# Patient Record
Sex: FEMALE | Race: OTHER | HISPANIC OR LATINO | URBAN - METROPOLITAN AREA
[De-identification: names, ages, dates, MRNs, and addresses within clinical notes are randomized per-mention and may not be internally consistent; named-entity substitution may affect disease eponyms.]

---

## 2017-06-29 ENCOUNTER — OUTPATIENT (OUTPATIENT)
Dept: OUTPATIENT SERVICES | Facility: HOSPITAL | Age: 22
LOS: 1 days | End: 2017-06-29
Payer: MEDICAID

## 2017-06-29 DIAGNOSIS — O03.9 COMPLETE OR UNSPECIFIED SPONTANEOUS ABORTION WITHOUT COMPLICATION: Chronic | ICD-10-CM

## 2017-06-29 DIAGNOSIS — O47.02 FALSE LABOR BEFORE 37 COMPLETED WEEKS OF GESTATION, SECOND TRIMESTER: ICD-10-CM

## 2017-06-29 LAB
APPEARANCE UR: CLEAR — SIGNIFICANT CHANGE UP
BACTERIA # UR AUTO: ABNORMAL
BILIRUB UR-MCNC: NEGATIVE — SIGNIFICANT CHANGE UP
BLD GP AB SCN SERPL QL: SIGNIFICANT CHANGE UP
COLOR SPEC: YELLOW — SIGNIFICANT CHANGE UP
DIFF PNL FLD: ABNORMAL
EPI CELLS # UR: SIGNIFICANT CHANGE UP
GLUCOSE UR QL: NEGATIVE MG/DL — SIGNIFICANT CHANGE UP
HCT VFR BLD CALC: 33.9 % — LOW (ref 37–47)
HGB BLD-MCNC: 11.6 G/DL — LOW (ref 12–16)
KETONES UR-MCNC: NEGATIVE — SIGNIFICANT CHANGE UP
LEUKOCYTE ESTERASE UR-ACNC: ABNORMAL
MCHC RBC-ENTMCNC: 32.1 PG — HIGH (ref 27–31)
MCHC RBC-ENTMCNC: 34.2 G/DL — SIGNIFICANT CHANGE UP (ref 32–36)
MCV RBC AUTO: 93.9 FL — SIGNIFICANT CHANGE UP (ref 81–99)
NITRITE UR-MCNC: NEGATIVE — SIGNIFICANT CHANGE UP
PH UR: 8 — SIGNIFICANT CHANGE UP (ref 5–8)
PLATELET # BLD AUTO: 156 K/UL — SIGNIFICANT CHANGE UP (ref 150–400)
PROT UR-MCNC: 15 MG/DL
RBC # BLD: 3.61 M/UL — LOW (ref 4.4–5.2)
RBC # FLD: 13.4 % — SIGNIFICANT CHANGE UP (ref 11–15.6)
RBC CASTS # UR COMP ASSIST: ABNORMAL /HPF (ref 0–4)
SP GR SPEC: 1.01 — SIGNIFICANT CHANGE UP (ref 1.01–1.02)
TYPE + AB SCN PNL BLD: SIGNIFICANT CHANGE UP
UROBILINOGEN FLD QL: NEGATIVE MG/DL — SIGNIFICANT CHANGE UP
WBC # BLD: 11.1 K/UL — HIGH (ref 4.8–10.8)
WBC # FLD AUTO: 11.1 K/UL — HIGH (ref 4.8–10.8)
WBC UR QL: ABNORMAL

## 2017-06-29 PROCEDURE — 76815 OB US LIMITED FETUS(S): CPT | Mod: 26

## 2017-06-29 PROCEDURE — 76815 OB US LIMITED FETUS(S): CPT

## 2017-06-29 PROCEDURE — 59025 FETAL NON-STRESS TEST: CPT

## 2017-06-29 PROCEDURE — 86900 BLOOD TYPING SEROLOGIC ABO: CPT

## 2017-06-29 PROCEDURE — 86850 RBC ANTIBODY SCREEN: CPT

## 2017-06-29 PROCEDURE — 36415 COLL VENOUS BLD VENIPUNCTURE: CPT

## 2017-06-29 PROCEDURE — T1013: CPT

## 2017-06-29 PROCEDURE — 86901 BLOOD TYPING SEROLOGIC RH(D): CPT

## 2017-06-29 PROCEDURE — 76817 TRANSVAGINAL US OBSTETRIC: CPT

## 2017-06-29 PROCEDURE — 85027 COMPLETE CBC AUTOMATED: CPT

## 2017-06-29 PROCEDURE — G0463: CPT

## 2017-06-29 PROCEDURE — 81001 URINALYSIS AUTO W/SCOPE: CPT

## 2017-06-29 RX ORDER — SODIUM CHLORIDE 9 MG/ML
1000 INJECTION, SOLUTION INTRAVENOUS
Qty: 0 | Refills: 0 | Status: DISCONTINUED | OUTPATIENT
Start: 2017-06-29 | End: 2017-07-14

## 2017-06-29 RX ADMIN — SODIUM CHLORIDE 125 MILLILITER(S): 9 INJECTION, SOLUTION INTRAVENOUS at 19:19

## 2017-07-10 ENCOUNTER — OUTPATIENT (OUTPATIENT)
Dept: OUTPATIENT SERVICES | Facility: HOSPITAL | Age: 22
LOS: 1 days | End: 2017-07-10
Payer: MEDICAID

## 2017-07-10 DIAGNOSIS — O03.9 COMPLETE OR UNSPECIFIED SPONTANEOUS ABORTION WITHOUT COMPLICATION: Chronic | ICD-10-CM

## 2017-07-10 DIAGNOSIS — O47.03 FALSE LABOR BEFORE 37 COMPLETED WEEKS OF GESTATION, THIRD TRIMESTER: ICD-10-CM

## 2017-07-10 LAB
APTT BLD: 29.8 SEC — SIGNIFICANT CHANGE UP (ref 27.5–37.4)
HCT VFR BLD CALC: 35.4 % — LOW (ref 37–47)
HGB BLD-MCNC: 12 G/DL — SIGNIFICANT CHANGE UP (ref 12–16)
INR BLD: 0.97 RATIO — SIGNIFICANT CHANGE UP (ref 0.88–1.16)
MCHC RBC-ENTMCNC: 32.3 PG — HIGH (ref 27–31)
MCHC RBC-ENTMCNC: 33.9 G/DL — SIGNIFICANT CHANGE UP (ref 32–36)
MCV RBC AUTO: 95.2 FL — SIGNIFICANT CHANGE UP (ref 81–99)
PLATELET # BLD AUTO: 151 K/UL — SIGNIFICANT CHANGE UP (ref 150–400)
PROTHROM AB SERPL-ACNC: 10.7 SEC — SIGNIFICANT CHANGE UP (ref 9.8–12.7)
RBC # BLD: 3.72 M/UL — LOW (ref 4.4–5.2)
RBC # FLD: 13 % — SIGNIFICANT CHANGE UP (ref 11–15.6)
WBC # BLD: 12.6 K/UL — HIGH (ref 4.8–10.8)
WBC # FLD AUTO: 12.6 K/UL — HIGH (ref 4.8–10.8)

## 2017-07-10 PROCEDURE — G0463: CPT

## 2017-07-10 PROCEDURE — 85610 PROTHROMBIN TIME: CPT

## 2017-07-10 PROCEDURE — 76817 TRANSVAGINAL US OBSTETRIC: CPT

## 2017-07-10 PROCEDURE — 85730 THROMBOPLASTIN TIME PARTIAL: CPT

## 2017-07-10 PROCEDURE — 59025 FETAL NON-STRESS TEST: CPT

## 2017-07-10 PROCEDURE — 36415 COLL VENOUS BLD VENIPUNCTURE: CPT

## 2017-07-10 PROCEDURE — 76811 OB US DETAILED SNGL FETUS: CPT

## 2017-07-10 PROCEDURE — 85027 COMPLETE CBC AUTOMATED: CPT

## 2017-07-16 ENCOUNTER — OUTPATIENT (OUTPATIENT)
Dept: OUTPATIENT SERVICES | Facility: HOSPITAL | Age: 22
LOS: 1 days | End: 2017-07-16
Payer: MEDICAID

## 2017-07-16 DIAGNOSIS — O47.02 FALSE LABOR BEFORE 37 COMPLETED WEEKS OF GESTATION, SECOND TRIMESTER: ICD-10-CM

## 2017-07-16 DIAGNOSIS — O03.9 COMPLETE OR UNSPECIFIED SPONTANEOUS ABORTION WITHOUT COMPLICATION: Chronic | ICD-10-CM

## 2017-07-16 PROCEDURE — 59025 FETAL NON-STRESS TEST: CPT

## 2017-07-16 PROCEDURE — G0463: CPT

## 2017-07-23 ENCOUNTER — INPATIENT (INPATIENT)
Facility: HOSPITAL | Age: 22
LOS: 3 days | Discharge: HOME | DRG: 782 | End: 2017-07-27
Attending: OBSTETRICS & GYNECOLOGY | Admitting: OBSTETRICS & GYNECOLOGY
Payer: MEDICAID

## 2017-07-23 VITALS — HEIGHT: 60 IN | WEIGHT: 97 LBS

## 2017-07-23 DIAGNOSIS — O47.02 FALSE LABOR BEFORE 37 COMPLETED WEEKS OF GESTATION, SECOND TRIMESTER: ICD-10-CM

## 2017-07-23 DIAGNOSIS — O03.9 COMPLETE OR UNSPECIFIED SPONTANEOUS ABORTION WITHOUT COMPLICATION: Chronic | ICD-10-CM

## 2017-07-23 DIAGNOSIS — O26.893 OTHER SPECIFIED PREGNANCY RELATED CONDITIONS, THIRD TRIMESTER: ICD-10-CM

## 2017-07-23 LAB
BLD GP AB SCN SERPL QL: SIGNIFICANT CHANGE UP
HCT VFR BLD CALC: 35 % — LOW (ref 37–47)
HGB BLD-MCNC: 11.5 G/DL — LOW (ref 12–16)
MCHC RBC-ENTMCNC: 31 PG — SIGNIFICANT CHANGE UP (ref 27–31)
MCHC RBC-ENTMCNC: 32.9 G/DL — SIGNIFICANT CHANGE UP (ref 32–36)
MCV RBC AUTO: 94.3 FL — SIGNIFICANT CHANGE UP (ref 81–99)
PLATELET # BLD AUTO: 175 K/UL — SIGNIFICANT CHANGE UP (ref 150–400)
RBC # BLD: 3.71 M/UL — LOW (ref 4.4–5.2)
RBC # FLD: 12.8 % — SIGNIFICANT CHANGE UP (ref 11–15.6)
TYPE + AB SCN PNL BLD: SIGNIFICANT CHANGE UP
WBC # BLD: 10.3 K/UL — SIGNIFICANT CHANGE UP (ref 4.8–10.8)
WBC # FLD AUTO: 10.3 K/UL — SIGNIFICANT CHANGE UP (ref 4.8–10.8)

## 2017-07-23 PROCEDURE — 76815 OB US LIMITED FETUS(S): CPT | Mod: 26,77

## 2017-07-23 RX ORDER — SODIUM CHLORIDE 9 MG/ML
3 INJECTION INTRAMUSCULAR; INTRAVENOUS; SUBCUTANEOUS EVERY 8 HOURS
Qty: 0 | Refills: 0 | Status: DISCONTINUED | OUTPATIENT
Start: 2017-07-23 | End: 2017-07-27

## 2017-07-23 RX ADMIN — SODIUM CHLORIDE 3 MILLILITER(S): 9 INJECTION INTRAMUSCULAR; INTRAVENOUS; SUBCUTANEOUS at 18:50

## 2017-07-23 RX ADMIN — SODIUM CHLORIDE 3 MILLILITER(S): 9 INJECTION INTRAMUSCULAR; INTRAVENOUS; SUBCUTANEOUS at 22:10

## 2017-07-24 DIAGNOSIS — O43.92 UNSPECIFIED PLACENTAL DISORDER, SECOND TRIMESTER: ICD-10-CM

## 2017-07-24 DIAGNOSIS — Z29.9 ENCOUNTER FOR PROPHYLACTIC MEASURES, UNSPECIFIED: ICD-10-CM

## 2017-07-24 DIAGNOSIS — O46.92 ANTEPARTUM HEMORRHAGE, UNSPECIFIED, SECOND TRIMESTER: ICD-10-CM

## 2017-07-24 DIAGNOSIS — O44.02 COMPLETE PLACENTA PREVIA NOS OR WITHOUT HEMORRHAGE, SECOND TRIMESTER: ICD-10-CM

## 2017-07-24 DIAGNOSIS — Z3A.24 24 WEEKS GESTATION OF PREGNANCY: ICD-10-CM

## 2017-07-24 DIAGNOSIS — O43.112 CIRCUMVALLATE PLACENTA, SECOND TRIMESTER: ICD-10-CM

## 2017-07-24 PROCEDURE — 99233 SBSQ HOSP IP/OBS HIGH 50: CPT | Mod: GC

## 2017-07-24 RX ADMIN — SODIUM CHLORIDE 3 MILLILITER(S): 9 INJECTION INTRAMUSCULAR; INTRAVENOUS; SUBCUTANEOUS at 10:17

## 2017-07-24 RX ADMIN — Medication 12 MILLIGRAM(S): at 10:11

## 2017-07-24 NOTE — CONSULT NOTE ADULT - PROBLEM SELECTOR RECOMMENDATION 6
Close maternal and fetal surveillance during hospitalization. I recommend giving a course of steroids to promote fetal lung maturity since this is the 4th bleeding episode and it is difficult to predict when she will be giving birth. Close maternal and fetal surveillance during hospitalization. I recommend giving a course of steroids to promote fetal lung maturity since this is the 4th bleeding episode and it is difficult to predict when she will be giving birth.  OB ultrasound today to rule out abruptio placenta.

## 2017-07-24 NOTE — CONSULT NOTE ADULT - PROBLEM SELECTOR RECOMMENDATION 5
Patient is a known placenta previa with her 4th episode of bleeding. Intermittent venous compression boots for DVT prophylaxis

## 2017-07-24 NOTE — CONSULT NOTE ADULT - PROBLEM SELECTOR RECOMMENDATION 3
Patient is a known placenta previa with her 4th episode of bleeding. The OB ultrasound on 7/10/17 also noted a partial circumvallate placenta which has not been associated with adverse pregnancy outcomes

## 2017-07-24 NOTE — CONSULT NOTE ADULT - SUBJECTIVE AND OBJECTIVE BOX
A 23 yo  Last Menstrual Period 2017. EDC 2017 at Gestational Age 24 weeks and 4 days. Patient presented to L&D at 4am this morning (2017) with vaginal bleeding. She is a known placenta previa with her 4th episode of bleeding. Patient denies any pain. She is not having contractions or loss of fluid. Patient reports fetal movement.     OB hx:   1. Term  in   2. D&C for a SAB in    3. breech circumvallate placenta marginal   PMH: None   PSH: D&C in 2016   Meds: PNV  Allergies: No Known Drug Allergies; reports hives 3 day after last     FAMILY HISTORY: Parents alive and healthy, no significant medical history.   Social History: Denies ETOH, smoking and drugs.     REVIEW OF SYSTEMS:  General:	  Skin/Breast:  Ophthalmologic:  ENMT:	  Respiratory and Thorax:	  Cardiovascular:	  Gastrointestinal:	  Genitourinary:	  Musculoskeletal:	  Neurological:	  Psychiatric:	  Hematology/Lymphatics:	  Endocrine:	  Allergic/Immunologic:	    FHT/toco: Baseline of 150 with moderate variation. Accelerations present, no decelerations. No contractions noted.     Vital Signs:    Physical Exam:  General: NAD  CVS: RRR, +S1/S2  Lungs: CTAB, no wheeze, ronchi or rales.   Abdomen: +BS, soft, NT.  Ext: No cyanosis, edema or calf tenderness.     Labs:                        11.5   10.3  )-----------( 175      ( 2017 10:46 )             35.0     Radiology:  EXAM:  US OB LTD FETUS(ES)                        PROCEDURE DATE:  2017   INTERPRETATION:  Pelvic ultrasound  CLINICAL INFORMATION:   Vaginal bleeding.    TECHNIQUE: Transabdominal ultrasonography of the gestation was performed.  FINDINGS: 2017 pelvic ultrasound  available for review. A single intrauterine fetus is found. The lie is breech. The placenta is a demonstrates findings of a circumvallate placenta. There is a marginal placenta previa. There is also a atypical venous lake inferiorly seen on prior examination with a venous flow likely indicating a prominent venous lake. However with history of vaginal bleeding continued sonographic surveillance recommended. Amniotic fluid index of 14.4 cm Amniotic fluid volume is within physiologic limits. Fetal motion and fetal  cardiac motion are identified. Fetal heart rate equals 136 beats per minute.    The following fetal measurements were performed:  Biparietal diameter . . . . . .  6.2 cm-25 weeks 1 day   Head circumference. . . . .  25.2 cm-25 weeks 2 days  Abdominal circumference.  19.8 cm-24 weeks 3 days   Femur length . . . . . . . . . . . 4.3 cm-24 weeks 3 days     Fetal age by sonography:  24 weeks 6 days  Estimated fetal weight by ultrasound: 711 g  Fetal anatomy not assessed at this time.  Cervical length equals 3.7cm.    IMPRESSION:     Single viable intrauterine 24 weeks 6 days fetus.    Circumvallate placenta with the marginal placenta previa and prominent venous lakes with vascular flow by Doppler imaging. Breech lie. Cervix closed measuring 3.7 cm. A close of clinical and follow-up sonogram is recommended. The patient should be considered high risk pregnancy because as the uterine wall stretches during the second half of pregnancy, the placenta cannot adapt and there is tearing of the membranes of bleeding from the edge of the chorionic plate. There is therefore relatively high rate of premature rupture membranes, antepartum bleeding and premature onset of labor. Critical value  discussed with Dr. Cortez, on 2017 at 9:07 AM with read back.Hospital policies for critical values including read back policy were followed. The verbal communication of the critical value supplements this written report.    MEDICATIONS  (STANDING):  sodium chloride 0.9% lock flush 3 milliLiter(s) IV Push every 8 hours A 21 yo  Last Menstrual Period 2017. EDC 2017 at Gestational Age 24 weeks and 4 days. Patient presented to L&D at 4am this morning (2017) with vaginal bleeding. She is a known placenta previa with her 4th episode of bleeding. Patient denies any pain. She is having irregular uterina contractions but no loss of fluid. Patient reports fetal movement.     OB hx:   1. Term  in   2. D&C for a SAB in    3. breech circumvallate placenta marginal   PMH: None   PSH: D&C in 2016   Meds: PNV  Allergies: No Known Drug Allergies; reports hives 3 day after last     FAMILY HISTORY: Parents alive and healthy, no significant medical history.   Social History: Denies ETOH, smoking and drugs.     REVIEW OF SYSTEMS:	  Constitutional: No weight loss, fever, chills, weakness or fatigue.   HEENT: Eyes: No visual loss, blurred vision, double vision or yellow sclerae. Ears, Nose, Throat: No hearing loss, sneezing, congestion, runny nose or sore throat.   SKIN: No rash or itching.   CARDIOVASCULAR: No chest pain, chest pressure or chest discomfort. No palpitations or edema.   RESPIRATORY: No shortness of breath, cough or sputum.   GASTROINTESTINAL: No anorexia, nausea, vomiting or diarrhea. No abdominal pain or blood.   GENITOURINARY: Burning on urination.    NEUROLOGICAL: No headache, dizziness, syncope, paralysis, ataxia, numbness or tingling in the extremities.   MUSCULOSKELETAL: No muscle, back pain, joint pain or stiffness.   HEMATOLOGIC: No anemia, bleeding or bruising.   LYMPHATICS: No enlarged nodes.   PSYCHIATRIC: No history of depression or anxiety.   ENDOCRINOLOGIC: No reports of sweating, cold or heat intolerance. No polyuria or polydipsia.   ALLERGIES: No history of asthma, hives, eczema or rhinitis.    FHT/toco: Baseline of 150 with moderate variation. Accelerations present, no decelerations. No contractions noted.     Physical Exam:  General: NAD  CVS: RRR, +S1/S2  Lungs: CTAB, no wheeze, ronchi or rales.   Abdomen: +BS, soft, NT.  Ext: No cyanosis, edema or calf tenderness.     Labs:                        11.5   10.3  )-----------( 175      ( 2017 10:46 )             35.0     Radiology:  EXAM:  US OB LTD FETUS(ES)                        PROCEDURE DATE:  2017   INTERPRETATION:  Pelvic ultrasound  CLINICAL INFORMATION:   Vaginal bleeding.    TECHNIQUE: Transabdominal ultrasonography of the gestation was performed.  FINDINGS: 2017 pelvic ultrasound  available for review. A single intrauterine fetus is found. The lie is breech. The placenta is a demonstrates findings of a circumvallate placenta. There is a marginal placenta previa. There is also a atypical venous lake inferiorly seen on prior examination with a venous flow likely indicating a prominent venous lake. However with history of vaginal bleeding continued sonographic surveillance recommended. Amniotic fluid index of 14.4 cm Amniotic fluid volume is within physiologic limits. Fetal motion and fetal  cardiac motion are identified. Fetal heart rate equals 136 beats per minute.    The following fetal measurements were performed:  Biparietal diameter . . . . . .  6.2 cm-25 weeks 1 day   Head circumference. . . . .  25.2 cm-25 weeks 2 days  Abdominal circumference.  19.8 cm-24 weeks 3 days   Femur length . . . . . . . . . . . 4.3 cm-24 weeks 3 days     Fetal age by sonography:  24 weeks 6 days  Estimated fetal weight by ultrasound: 711 g  Fetal anatomy not assessed at this time.  Cervical length equals 3.7cm.    IMPRESSION:     Single viable intrauterine 24 weeks 6 days fetus.    Circumvallate placenta with the marginal placenta previa and prominent venous lakes with vascular flow by Doppler imaging. Breech lie. Cervix closed measuring 3.7 cm. A close of clinical and follow-up sonogram is recommended. The patient should be considered high risk pregnancy because as the uterine wall stretches during the second half of pregnancy, the placenta cannot adapt and there is tearing of the membranes of bleeding from the edge of the chorionic plate. There is therefore relatively high rate of premature rupture membranes, antepartum bleeding and premature onset of labor. Critical value  discussed with Dr. Cortez, on 2017 at 9:07 AM with read back.Hospital policies for critical values including read back policy were followed. The verbal communication of the critical value supplements this written report.    MEDICATIONS  (STANDING):  sodium chloride 0.9% lock flush 3 milliLiter(s) IV Push every 8 hours A 21 yo  Last Menstrual Period 2017. EDC 2017 at Gestational Age 24 weeks and 4 days. Patient presented to L&D at 4am this morning (2017) with vaginal bleeding. She is a known placenta previa with her 4th episode of bleeding. Patient denies any pain. She is having irregular uterina contractions but no loss of fluid. Patient reports fetal movement.     OB hx:   1. Term  in   2. D&C for a SAB in    3. breech circumvallate placenta marginal   PMH: None   PSH: D&C in 2016   Meds: PNV  Allergies: No Known Drug Allergies; reports hives 3 day after last     FAMILY HISTORY: Parents alive and healthy, no significant medical history.   Social History: Denies ETOH, smoking and drugs.     REVIEW OF SYSTEMS:	  Constitutional: No weight loss, fever, chills, weakness or fatigue.   HEENT: Eyes: No visual loss, blurred vision, double vision or yellow sclerae. Ears, Nose, Throat: No hearing loss, sneezing, congestion, runny nose or sore throat.   Skin: No rash or itching.   Cardiovascular: No chest pain, chest pressure or chest discomfort. No palpitations or edema.   Respiratory: No shortness of breath, cough or sputum.   Gastrointestinal: No anorexia, nausea, vomiting or diarrhea. No abdominal pain or blood.   Genitourinary: Burning on urination.    Neurological: No headache, dizziness, syncope, paralysis, ataxia, numbness or tingling in the extremities.   Musculoskeletal: No muscle, back pain, joint pain or stiffness.   Hematologic: No anemia, bleeding or bruising.   Lymphatics: No enlarged nodes.   Psychiatric: No history of depression or anxiety.   Endocrinologic: No reports of sweating, cold or heat intolerance. No polyuria or polydipsia.   Allergies: No history of asthma, hives, eczema or rhinitis.    FHT/toco: Baseline of 150 with moderate variation. Accelerations present, no decelerations. No contractions noted.     Physical Exam:  General: NAD  CVS: RRR, +S1/S2  Lungs: CTAB, no wheeze, ronchi or rales.   Abdomen: +BS, soft, NT.  Ext: No cyanosis, edema or calf tenderness.     Labs:                        11.5   10.3  )-----------( 175      ( 2017 10:46 )             35.0     Radiology:  EXAM:  US OB LTD FETUS(ES)                        PROCEDURE DATE:  2017   INTERPRETATION:  Pelvic ultrasound  CLINICAL INFORMATION:   Vaginal bleeding.    TECHNIQUE: Transabdominal ultrasonography of the gestation was performed.  FINDINGS: 2017 pelvic ultrasound  available for review. A single intrauterine fetus is found. The lie is breech. The placenta is a demonstrates findings of a circumvallate placenta. There is a marginal placenta previa. There is also a atypical venous lake inferiorly seen on prior examination with a venous flow likely indicating a prominent venous lake. However with history of vaginal bleeding continued sonographic surveillance recommended. Amniotic fluid index of 14.4 cm Amniotic fluid volume is within physiologic limits. Fetal motion and fetal  cardiac motion are identified. Fetal heart rate equals 136 beats per minute.    The following fetal measurements were performed:  Biparietal diameter . . . . . .  6.2 cm-25 weeks 1 day   Head circumference. . . . .  25.2 cm-25 weeks 2 days  Abdominal circumference.  19.8 cm-24 weeks 3 days   Femur length . . . . . . . . . . . 4.3 cm-24 weeks 3 days     Fetal age by sonography:  24 weeks 6 days  Estimated fetal weight by ultrasound: 711 g  Fetal anatomy not assessed at this time.  Cervical length equals 3.7cm.    IMPRESSION:     Single viable intrauterine 24 weeks 6 days fetus.    Circumvallate placenta with the marginal placenta previa and prominent venous lakes with vascular flow by Doppler imaging. Breech lie. Cervix closed measuring 3.7 cm. A close of clinical and follow-up sonogram is recommended. The patient should be considered high risk pregnancy because as the uterine wall stretches during the second half of pregnancy, the placenta cannot adapt and there is tearing of the membranes of bleeding from the edge of the chorionic plate. There is therefore relatively high rate of premature rupture membranes, antepartum bleeding and premature onset of labor. Critical value  discussed with Dr. Cortez, on 2017 at 9:07 AM with read back.Hospital policies for critical values including read back policy were followed. The verbal communication of the critical value supplements this written report.    MEDICATIONS  (STANDING):  sodium chloride 0.9% lock flush 3 milliLiter(s) IV Push every 8 hours A 21 yo  Last Menstrual Period 2017. EDC 2017 at Gestational Age 24 weeks and 4 days. Patient presented to L&D at 4am on 2017 with vaginal bleeding. She is known to have a placenta previa. This is her 4th episode of vaginal bleeding. Patient denies any pain. She is feeling irregular uterine contractions. She denied loss of fluid. Patient reports fetal movement.     OB hx:   1. Term  in   2. D&C for a SAB in      PMH: None   PSH: D&C in 2016   Meds: PNV  Allergies: No Known Drug Allergies    FAMILY HISTORY: Parents alive and healthy, no significant medical history.   Social History: Denies ETOH, smoking and drugs.     REVIEW OF SYSTEMS:	  Constitutional: No weight loss, fever, chills, weakness or fatigue.   HEENT: Eyes: No visual loss, blurred vision, double vision or yellow sclerae. Ears, Nose, Throat: No hearing loss, sneezing, congestion, runny nose or sore throat.   Skin: No rash or itching.   Cardiovascular: No chest pain, chest pressure or chest discomfort. No palpitations or edema.   Respiratory: No shortness of breath, cough or sputum.   Gastrointestinal: No anorexia, nausea, vomiting or diarrhea. No abdominal pain or blood.   Genitourinary: No burning on urination.    Neurological: No headache, dizziness, syncope, paralysis, ataxia, numbness or tingling in the extremities.   Musculoskeletal: No muscle, back pain, joint pain or stiffness.   Hematologic: No anemia, bleeding or bruising.   Lymphatics: No enlarged nodes.   Psychiatric: No history of depression or anxiety.   Endocrinologic: No reports of sweating, cold or heat intolerance. No polyuria or polydipsia.   Allergies: No history of asthma, hives, eczema or rhinitis.    MEDICATIONS  (STANDING):  sodium chloride 0.9% lock flush 3 milliLiter(s) IV Push every 8 hours    Physical Exam:  General: NAD  CVS: RRR, +S1/S2  Lungs: CTAB, no wheeze, ronchi or rales.   Abdomen: +BS, soft, NT.  Ext: No cyanosis, edema or calf tenderness.     Fetal Monitoring: FHR baseline of 130 bpm with moderate variation. Accelerations present, no decelerations. A mild occasional uterine contraction was noted.       Labs:                        11.5   10.3  )-----------( 175      ( 2017 10:46 )             35.0     Radiology:  EXAM:  US OB LTD FETUS(ES)                        PROCEDURE DATE:  2017   INTERPRETATION:  Pelvic ultrasound  CLINICAL INFORMATION:   Vaginal bleeding.    TECHNIQUE: Transabdominal ultrasonography of the gestation was performed.  FINDINGS: 2017 pelvic ultrasound  available for review. A single intrauterine fetus is found. The lie is breech. The placenta is a demonstrates findings of a circumvallate placenta. There is a marginal placenta previa. There is also a atypical venous lake inferiorly seen on prior examination with a venous flow likely indicating a prominent venous lake. However with history of vaginal bleeding continued sonographic surveillance recommended. Amniotic fluid index of 14.4 cm Amniotic fluid volume is within physiologic limits. Fetal motion and fetal  cardiac motion are identified. Fetal heart rate equals 136 beats per minute.    The following fetal measurements were performed:  Biparietal diameter . . . . . .  6.2 cm-25 weeks 1 day   Head circumference. . . . .  25.2 cm-25 weeks 2 days  Abdominal circumference.  19.8 cm-24 weeks 3 days   Femur length . . . . . . . . . . . 4.3 cm-24 weeks 3 days     Fetal age by sonography:  24 weeks 6 days  Estimated fetal weight by ultrasound: 711 g  Fetal anatomy not assessed at this time.  Cervical length equals 3.7cm.    IMPRESSION:     Single viable intrauterine 24 weeks 6 days fetus.    Circumvallate placenta with the marginal placenta previa and prominent venous lakes with vascular flow by Doppler imaging. Breech lie. Cervix closed measuring 3.7 cm. A close of clinical and follow-up sonogram is recommended. The patient should be considered high risk pregnancy because as the uterine wall stretches during the second half of pregnancy, the placenta cannot adapt and there is tearing of the membranes of bleeding from the edge of the chorionic plate. There is therefore relatively high rate of premature rupture membranes, antepartum bleeding and premature onset of labor. Critical value  discussed with Dr. Cortez, on 2017 at 9:07 AM with read back. Hospital policies for critical values including read back policy were followed. The verbal communication of the critical value supplements this written report. A 23 yo  Last Menstrual Period 2017. EDC 2017 at Gestational Age 24 weeks and 4 days. Patient presented to L&D at 4am on 2017 with vaginal bleeding. She is known to have a placenta previa. This is her 4th episode of vaginal bleeding. Patient denies any abdominal pain. She is feeling irregular uterine contractions. She continues to have vaginal bleeding and to pass blood clots per vagina. She denied loss of fluid. Patient reports fetal movement.     OB hx:   1. Term  in   2. D&C for a SAB in      PMH: None   PSH: D&C in    Meds: PNV  Allergies: No Known Drug Allergies    FAMILY HISTORY: Parents alive and healthy, no significant medical history.   Social History: Denies ETOH, smoking and drugs.     REVIEW OF SYSTEMS:	  Constitutional: No weight loss, fever, chills, weakness or fatigue.   HEENT: Eyes: No visual loss, blurred vision, double vision or yellow sclerae. Ears, Nose, Throat: No hearing loss, sneezing, congestion, runny nose or sore throat.   Skin: No rash or itching.   Cardiovascular: No chest pain, chest pressure or chest discomfort. No palpitations or edema.   Respiratory: No shortness of breath, cough or sputum.   Gastrointestinal: No anorexia, nausea, vomiting or diarrhea. No abdominal pain or blood.   Genitourinary: No burning on urination.    Neurological: No headache, dizziness, syncope, paralysis, ataxia, numbness or tingling in the extremities.   Musculoskeletal: No muscle, back pain, joint pain or stiffness.   Hematologic: No anemia, bleeding or bruising.   Lymphatics: No enlarged nodes.   Psychiatric: No history of depression or anxiety.   Endocrinologic: No reports of sweating, cold or heat intolerance. No polyuria or polydipsia.   Allergies: No history of asthma, hives, eczema or rhinitis.    MEDICATIONS  (STANDING):  sodium chloride 0.9% lock flush 3 milliLiter(s) IV Push every 8 hours    Physical Exam:  General: NAD  CVS: RRR, +S1/S2  Lungs: CTAB, no wheeze, ronchi or rales.   Abdomen: +BS, soft, NT.  Ext: No cyanosis, edema or calf tenderness.     Fetal Monitoring: FHR baseline of 130 bpm with moderate variation. Accelerations present, no decelerations. A mild occasional uterine contraction was noted.       Labs:                        11.5   10.3  )-----------( 175      ( 2017 10:46 )             35.0     Radiology:  EXAM:  US OB LTD FETUS(ES)                        PROCEDURE DATE:  2017   INTERPRETATION:  Pelvic ultrasound  CLINICAL INFORMATION:   Vaginal bleeding.    TECHNIQUE: Transabdominal ultrasonography of the gestation was performed.  FINDINGS: 2017 pelvic ultrasound  available for review. A single intrauterine fetus is found. The lie is breech. The placenta is a demonstrates findings of a circumvallate placenta. There is a marginal placenta previa. There is also a atypical venous lake inferiorly seen on prior examination with a venous flow likely indicating a prominent venous lake. However with history of vaginal bleeding continued sonographic surveillance recommended. Amniotic fluid index of 14.4 cm Amniotic fluid volume is within physiologic limits. Fetal motion and fetal  cardiac motion are identified. Fetal heart rate equals 136 beats per minute.    The following fetal measurements were performed:  Biparietal diameter . . . . . .  6.2 cm-25 weeks 1 day   Head circumference. . . . .  25.2 cm-25 weeks 2 days  Abdominal circumference.  19.8 cm-24 weeks 3 days   Femur length . . . . . . . . . . . 4.3 cm-24 weeks 3 days     Fetal age by sonography:  24 weeks 6 days  Estimated fetal weight by ultrasound: 711 g  Fetal anatomy not assessed at this time.  Cervical length equals 3.7cm.    IMPRESSION:     Single viable intrauterine 24 weeks 6 days fetus.    Circumvallate placenta with the marginal placenta previa and prominent venous lakes with vascular flow by Doppler imaging. Breech lie. Cervix closed measuring 3.7 cm. A close of clinical and follow-up sonogram is recommended. The patient should be considered high risk pregnancy because as the uterine wall stretches during the second half of pregnancy, the placenta cannot adapt and there is tearing of the membranes of bleeding from the edge of the chorionic plate. There is therefore relatively high rate of premature rupture membranes, antepartum bleeding and premature onset of labor. Critical value  discussed with Dr. Cortez, on 2017 at 9:07 AM with read back. Hospital policies for critical values including read back policy were followed. The verbal communication of the critical value supplements this written report.

## 2017-07-24 NOTE — CONSULT NOTE ADULT - PROBLEM SELECTOR RECOMMENDATION 9
Patient came in with her 4th episode of vaginal bleeding and was admitted for observation. Ultrasound shows a marginal placenta previa. Patient to be on pelvic rest and to refrain from sexual intercourse. This is her 4th episode of vaginal bleeding due to known placenta previa. This is her 4th episode of vaginal bleeding due to known placenta previa. OB ultrasound today to rule out abruptio placenta.

## 2017-07-24 NOTE — CONSULT NOTE ADULT - PROBLEM SELECTOR PROBLEM 3
24 weeks gestation of pregnancy Circumvallate placenta during pregnancy in second trimester, antepartum

## 2017-07-24 NOTE — CONSULT NOTE ADULT - ASSESSMENT
A 23yo  with LMP 2017 at Gestational Age 24 weeks and 4 days. Patient is here with a marginal placenta previa and her 4th episode of bleeding.

## 2017-07-24 NOTE — CONSULT NOTE ADULT - PROBLEM SELECTOR RECOMMENDATION 2
Vaginal bleeding due to know placenta previa. She had an OB ultrasound on 7/10/17 that noted a partial placenta previa. Agree with admission for close maternal and fetal monitoring. She continues to have  vaginal bleedin Patient to be on pelvic rest and to refrain from sexual intercourse. She had an OB ultrasound on 7/10/17 that noted a partial placenta previa. Agree with admission for close maternal and fetal monitoring. She continues to have vaginal bleeding. Patient advised to observe pelvic rest (no sexual intercourse). I recommend giving a course of steroids to promote fetal lung maturity since this is the 4th bleeding episode and it is difficult to predict when she will be giving birth. Patient should not be discharged home unless she does not have vaginal bleeding for 72 hours. She must also live within 20 minutes from the hospital and have a relative 24/7 to bring her to the hospital in the event of another bleeding episode. She had an OB ultrasound on 7/10/17 that noted a partial placenta previa. Agree with admission for close maternal and fetal monitoring. She continues to have vaginal bleeding. Patient advised to observe pelvic rest (no sexual intercourse). I recommend giving a course of steroids to promote fetal lung maturity since this is the 4th bleeding episode and it is difficult to predict when she will be giving birth. Patient should not be discharged home unless she does not have vaginal bleeding for 72 hours. She must also live within 20 minutes from the hospital and have a relative 24/7 to bring her to the hospital in the event of another bleeding episode.  OB ultrasound today to rule out abruptio placenta.

## 2017-07-25 PROCEDURE — 99232 SBSQ HOSP IP/OBS MODERATE 35: CPT | Mod: GC

## 2017-07-25 RX ADMIN — SODIUM CHLORIDE 3 MILLILITER(S): 9 INJECTION INTRAMUSCULAR; INTRAVENOUS; SUBCUTANEOUS at 14:00

## 2017-07-25 RX ADMIN — Medication 12 MILLIGRAM(S): at 09:58

## 2017-07-25 RX ADMIN — SODIUM CHLORIDE 3 MILLILITER(S): 9 INJECTION INTRAMUSCULAR; INTRAVENOUS; SUBCUTANEOUS at 06:15

## 2017-07-25 NOTE — PROGRESS NOTE ADULT - PROBLEM SELECTOR PLAN 3
Circumvallate placenta during pregnancy in second trimester, antepartum.  Recommendation: The OB ultrasound on 7/10/17 also noted a partial circumvallate placenta which has not been associated with adverse pregnancy outcomes

## 2017-07-25 NOTE — PROGRESS NOTE ADULT - PROBLEM SELECTOR PLAN 6
24 weeks gestation of pregnancy. Recommendation: Close maternal and fetal surveillance during hospitalization. I recommend giving a course of steroids to promote fetal lung maturity since this is the 4th bleeding episode and it is difficult to predict when she will be giving birth 24 weeks gestation of pregnancy. Recommendation: Close maternal and fetal surveillance during hospitalization. I recommend giving a course of steroids to promote fetal lung maturity since this is the 4th bleeding episode and it is difficult to predict when she will be giving birth. Patient received first dose of steroids yesterday. Second dose to be given today

## 2017-07-25 NOTE — PROGRESS NOTE ADULT - SUBJECTIVE AND OBJECTIVE BOX
A 23 yo  Last Menstrual Period 2017. EDC 2017 at Gestational Age 24 weeks and 5 days. Patient presented to L&D at 4am on 2017 with vaginal bleeding. She is known to have a placenta previa. This is her 4th episode of vaginal bleeding. Patient denies any abdominal pain. She is no longer feeling uterine contractions. She continues to have vaginal bleeding and to pass blood clots per vagina. She denied loss of fluid. Patient reports fetal movement.     OB hx:   1. Term  in   2. D&C for a SAB in      PMH: None   PSH: D&C in    Meds: PNV  Allergies: No Known Drug Allergies    FAMILY HISTORY: Parents alive and healthy, no significant medical history.   Social History: Denies ETOH, smoking and drugs.     REVIEW OF SYSTEMS:	  Constitutional: No weight loss, fever, chills, weakness or fatigue.   HEENT: Eyes: No visual loss, blurred vision, double vision or yellow sclerae. Ears, Nose, Throat: No hearing loss, sneezing, congestion, runny nose or sore throat.   Skin: No rash or itching.   Cardiovascular: No chest pain, chest pressure or chest discomfort. No palpitations or edema.   Respiratory: No shortness of breath, cough or sputum.   Gastrointestinal: No anorexia, nausea, vomiting or diarrhea. No abdominal pain or blood.   Genitourinary: No burning on urination.    Neurological: No headache, dizziness, syncope, paralysis, ataxia, numbness or tingling in the extremities.   Musculoskeletal: No muscle, back pain, joint pain or stiffness.   Hematologic: No anemia, bleeding or bruising.   Lymphatics: No enlarged nodes.   Psychiatric: No history of depression or anxiety.   Endocrinologic: No reports of sweating, cold or heat intolerance. No polyuria or polydipsia.   Allergies: No history of asthma, hives, eczema or rhinitis.    MEDICATIONS  (STANDING):  sodium chloride 0.9% lock flush 3 milliLiter(s) IV Push every 8 hours    Physical Exam:  General: NAD  CVS: RRR, +S1/S2  Lungs: CTAB, no wheeze, ronchi or rales.   Abdomen: +BS, soft, NT.  Ext: No cyanosis, edema or calf tenderness.     Fetal Monitoring: FHR baseline of 135 bpm with moderate variation. Accelerations present, no decelerations. No uterine contractions were noted.     Labs:                        11.5   10.3  )-----------( 175      ( 2017 10:46 )             35.0 A 21 yo  Last Menstrual Period 2017. EDC 2017 at Gestational Age 24 weeks and 5 days. Patient presented to L&D at 4am on 2017 with vaginal bleeding. She is known to have a placenta previa. This is her 4th episode of vaginal bleeding. Patient denies any abdominal pain. She is no longer feeling uterine contractions. She continues to have vaginal bleeding and to pass blood clots per vagina. She denied loss of fluid. Patient reports fetal movement.     OB hx:   1. Term  in   2. D&C for a SAB in      PMH: None   PSH: D&C in    Meds: PNV  Allergies: No Known Drug Allergies    FAMILY HISTORY: Parents alive and healthy, no significant medical history.   Social History: Denies ETOH, smoking and drugs.     REVIEW OF SYSTEMS:	  Constitutional: No weight loss, fever, chills, weakness or fatigue.   HEENT: Eyes: No visual loss, blurred vision, double vision or yellow sclerae. Ears, Nose, Throat: No hearing loss, sneezing, congestion, runny nose or sore throat.   Skin: No rash or itching.   Cardiovascular: No chest pain, chest pressure or chest discomfort. No palpitations or edema.   Respiratory: No shortness of breath, cough or sputum.   Gastrointestinal: No anorexia, nausea, vomiting or diarrhea. No abdominal pain or blood.   Genitourinary: No burning on urination.    Neurological: No headache, dizziness, syncope, paralysis, ataxia, numbness or tingling in the extremities.   Musculoskeletal: No muscle, back pain, joint pain or stiffness.   Hematologic: No anemia, bleeding or bruising.   Lymphatics: No enlarged nodes.   Psychiatric: No history of depression or anxiety.   Endocrinologic: No reports of sweating, cold or heat intolerance. No polyuria or polydipsia.   Allergies: No history of asthma, hives, eczema or rhinitis.    MEDICATIONS  (STANDING):  sodium chloride 0.9% lock flush 3 milliLiter(s) IV Push every 8 hours    Vital Signs Last 24 Hrs  T(C): --36.7   HR: -- 96  BP: -- 110/59   RR: -- 16    Physical Exam:  General: NAD  CVS: RRR, +S1/S2  Lungs: CTAB, no wheeze, ronchi or rales.   Abdomen: +BS, soft, NT.  Ext: No cyanosis, edema or calf tenderness.     Fetal Monitoring: FHR baseline of 135 bpm with moderate variation. Accelerations present, no decelerations. No uterine contractions were noted.     Labs:                        11.5   10.3  )-----------( 175      ( 2017 10:46 )             35.0

## 2017-07-25 NOTE — PROGRESS NOTE ADULT - PROBLEM SELECTOR PLAN 5
Need for prophylactic measure. Recommendation: Intermittent venous compression boots for DVT prophylaxis

## 2017-07-25 NOTE — PROGRESS NOTE ADULT - PROBLEM SELECTOR PLAN 4
Recommendation: The OB ultrasound on 7/10/17 also noted placental lakes which are benign placental findings Recommendation: The OB ultrasound on 7/10/17 also noted placental lakes which are benign placental findings. Yesterday's ultrasound examination was suggestive of an anterior accessory placental lobe

## 2017-07-25 NOTE — PROGRESS NOTE ADULT - ASSESSMENT
A 23yo  with LMP 2017 at Gestational Age 24 weeks and 5 days. Patient is here with a marginal placenta previa and her 4th episode of bleeding. She got one dose of steroids and is due for her second at 10:00am today (17). A 21yo  with LMP 2017 at Gestational Age 24 weeks and 5 days. Patient is here with a marginal placenta previa and her 4th episode of bleeding. She also has a partial circumvallate placenta, placental lakes, and a suspected accessory lobe.  She got one dose of steroids and is due for her second at 10:00am today (17).

## 2017-07-25 NOTE — PROGRESS NOTE ADULT - PROBLEM SELECTOR PLAN 1
Vaginal bleeding in pregnancy, second trimester. Recommendation: This is her 4th episode of vaginal bleeding due to known placenta previa Vaginal bleeding in pregnancy, second trimester. Recommendation: This is her 4th episode of vaginal bleeding due to known placenta previa. No evidence of abruptio placenta during yesterday's ultrasound examination

## 2017-07-26 RX ADMIN — SODIUM CHLORIDE 3 MILLILITER(S): 9 INJECTION INTRAMUSCULAR; INTRAVENOUS; SUBCUTANEOUS at 22:00

## 2017-07-26 NOTE — PROGRESS NOTE ADULT - ASSESSMENT
Patient is a 22y old  Female who presents with vaginal spotting and diagnosed with placenta previa. Spotting improved. Will continue observation.

## 2017-07-26 NOTE — PROGRESS NOTE ADULT - ATTENDING COMMENTS
Patient examined with resident. Recommendations discussed with resident, OB provider and L & D RN staff
24 wks   placenta previa  admitted after episode of vag bleeding - now resolved  s/p steroid course  NST reactive overnight    Plan: observe for at least 72hrs per MFM recommendation  NST q shift

## 2017-07-26 NOTE — CHART NOTE - NSCHARTNOTEFT_GEN_A_CORE
23 y/o p1011 @ 24+6 weeks with placenta previa  NST done  reactive with moderate variability in 140's with no contractions  patient reports no bleeding at this time  status post 2 doses of steroids  will continue nst q shift

## 2017-07-26 NOTE — PROGRESS NOTE ADULT - PROBLEM SELECTOR PLAN 2
VCD when not ambulating.
Placenta previa antepartum in second trimester. Recommendation: She had an OB ultrasound on 7/10/17 that noted a partial placenta previa. Agree with admission for close maternal and fetal monitoring. She continues to have vaginal bleeding. Patient advised to observe pelvic rest (no sexual intercourse). I recommend giving a course of steroids to promote fetal lung maturity since this is the 4th bleeding episode and it is difficult to predict when she will be giving birth. Patient should not be discharged home unless she does not have vaginal bleeding for 72 hours. She must also live within 20 minutes from the hospital and have a relative 24/7 to bring her to the hospital in the event of another bleeding episode

## 2017-07-26 NOTE — PROGRESS NOTE ADULT - SUBJECTIVE AND OBJECTIVE BOX
Patient is a 22y old  Female who presents with vaginal spotting. Pt has hx of partial placental previa and presented multiple times with vaginal spotting. Pt now kept under observation and seen by MFM specialist. Today, she says spotting is improved. She denies any complaint at this moment. She denies any lightheadedness, headache, vomiting.      PAST MEDICAL & SURGICAL HISTORY:  No pertinent past medical history  SAB (spontaneous ): s/p D+C    sodium chloride 0.9% lock flush 3 milliLiter(s) IV Push every 8 hours    FAMILY HISTORY:    Social History: Denies ETOH, smoking and drugs.   POB/GYN Hx:    Vital Signs:  Vital Signs Last 24 Hrs  T(C): 36.7 (2017 00:11), Max: 36.8 (2017 18:20)  T(F): 98.1 (2017 00:11), Max: 98.3 (2017 18:20)  HR: 86 (2017 00:11) (86 - 93)  BP: 104/55 (2017 00:11) (104/55 - 111/67)  BP(mean): --  RR: 16 (2017 00:11) (16 - 18)  SpO2: --    Physical Exam:  General: NAD. Nystagmus noted Chronic.  CVS: RRR, +S1/S2  Lungs: CTAB, no wheeze, rhonchi or rales.   Breast: No tenderness or abnormal discharge.  Abdomen: +BS, soft, NT.  Pelvic: Normal external genetalia, no lesions in vaginal canal, no pooling in vaginal vault, No lesions on cervix, os is closed, no discharge from cervical os. Negative CMT, negative adnexal tenderness.  Ext: No cyanosis, edema or calf tenderness.     Labs:  No new labs    Imaging  EXAM:  US OB LTD FETUS(ES)                          PROCEDURE DATE:  2017          INTERPRETATION:  Pelvic ultrasound  CLINICAL INFORMATION:   Vaginal bleeding.    TECHNIQUE:   Transabdominal ultrasonography of the gestation was   performed.    FINDINGS: 2017 pelvic ultrasound  available for review.  A single intrauterine fetus is found.  The lie is breech.  The placenta   is a demonstrates findings of a circumvallate placenta. There is a   marginal placenta previa. There is also a atypical venous lake inferiorly   seen on prior examination with a venous flow likely indicating a   prominent venous lake. However with history of vaginal bleeding continued   sonographic surveillance recommended.  Amniotic fluid index of 14.4 cm Amniotic fluid volume is within   physiologic limits.  Fetal motion and fetal  cardiac motion are   identified. Fetal heart rate equals 136 beats per minute.    The following fetal measurements were performed:    Biparietal diameter . . . . . .  6.2 cm-25 weeks 1 day   Head circumference. . . . .  25.2 cm-25 weeks 2 days  Abdominal circumference.  19.8 cm-24 weeks 3 days   Femur length . . . . . . . . . . . 4.3 cm-24 weeks 3 days     Fetal age by sonography:  24 weeks 6 days    Estimated fetal weight by ultrasound: 711 g    Fetal anatomy not assessed at this time.  Cervical length equals 3.7cm.    IMPRESSION:   Single viable [id number] weeks 6 days fetus.    Circumvallate placenta with the marginal placenta previa and prominent   venous lakes with vascular flow by Doppler imaging. Breech lie. Cervix   closed measuring 3.7 cm.  A close of clinical and follow-up sonogram is recommended.   The patient should be considered high risk pregnancy because as the   uterine wall stretches during the second half of pregnancy, the placenta   cannot adapt and there is tearing of the membranes of bleeding from the   edge of the chorionic plate. There is therefore relatively high rate of   premature rupture membranes, antepartum bleeding and premature onset of   labor.  Critical value  discussed with Dr. Cortez, on 2017 at 9:07 AM with   read back.  Hospital policies for critical values including read back   policy were followed.  The verbal communication of the critical value   supplements this written report.       MEDICATIONS  (STANDING):  sodium chloride 0.9% lock flush 3 milliLiter(s) IV Push every 8 hours    MEDICATIONS  (PRN):

## 2017-07-26 NOTE — PROGRESS NOTE ADULT - PROBLEM SELECTOR PLAN 1
With vaginal spotting  Spotting now improved.  Stable  Will recommend bed rest for now and continue monitoring.  Regular diet.  Will f/u MFM recommendation

## 2017-07-27 ENCOUNTER — TRANSCRIPTION ENCOUNTER (OUTPATIENT)
Age: 22
End: 2017-07-27

## 2017-07-27 VITALS
TEMPERATURE: 98 F | SYSTOLIC BLOOD PRESSURE: 92 MMHG | RESPIRATION RATE: 16 BRPM | DIASTOLIC BLOOD PRESSURE: 49 MMHG | HEART RATE: 84 BPM

## 2017-07-27 PROCEDURE — 86901 BLOOD TYPING SEROLOGIC RH(D): CPT

## 2017-07-27 PROCEDURE — 76819 FETAL BIOPHYS PROFIL W/O NST: CPT

## 2017-07-27 PROCEDURE — 86900 BLOOD TYPING SEROLOGIC ABO: CPT

## 2017-07-27 PROCEDURE — 76817 TRANSVAGINAL US OBSTETRIC: CPT

## 2017-07-27 PROCEDURE — 76815 OB US LIMITED FETUS(S): CPT

## 2017-07-27 PROCEDURE — T1013: CPT

## 2017-07-27 PROCEDURE — 85027 COMPLETE CBC AUTOMATED: CPT

## 2017-07-27 PROCEDURE — 86850 RBC ANTIBODY SCREEN: CPT

## 2017-07-27 PROCEDURE — 36415 COLL VENOUS BLD VENIPUNCTURE: CPT

## 2017-07-27 RX ADMIN — SODIUM CHLORIDE 3 MILLILITER(S): 9 INJECTION INTRAMUSCULAR; INTRAVENOUS; SUBCUTANEOUS at 06:18

## 2017-07-27 NOTE — DISCHARGE NOTE ANTEPARTUM - HOSPITAL COURSE
This patient is a  21 yo , 25weeks who was admitted with vaginal bleeding and contractions. Pt has a history of placenta previa by sono, She was given 2 doses of betamethasone for fetal lung maturity. Pt was observed, FHR was monitered. During her stay, the bleeding resolved, contractions resolved . Patient was transferred to postpartum floor and monitored. Pt is stable, ambulating, voiding, evacuating. Patient is medically ready for discharge. Instructed to Follow up at [facility] on 2017 for her prenatal OB consult. Pt instructed to come back if she has symptoms of vaginal bleeding, contractions or labor.

## 2017-07-27 NOTE — DISCHARGE NOTE OB - PLAN OF CARE
Reached Pelvic rest, regular diet, discharge today, follow up at [facility] with OB for prenatal care on 07/31/2017. Pt instructed to come back if she has signs of bleeding or labor Pelvic rest, regular diet, discharge today, follow up at [facility] with OB for prenatal care on 07/31/2017. Pt instructed to come back if she has signs of bleeding or labor reached Pelvic rest, regular diet, discharge today, follow up at [facility] with OB for prenatal care on 07/31/2017. Pt instructed to come back if she has signs of bleeding or labor

## 2017-07-27 NOTE — DISCHARGE NOTE ANTEPARTUM - CARE PLAN
Principal Discharge DX:	24 weeks gestation of pregnancy  Goal:	Reached  Instructions for follow-up, activity and diet:	Pelvic rest, regular diet, discharge today, follow up at [facility] with OB for prenatal care on 07/31/2017. Pt instructed to come back if she has signs of bleeding or labor  Secondary Diagnosis:	Circumvallate placenta during pregnancy in second trimester, antepartum  Goal:	reached  Instructions for follow-up, activity and diet:	Pelvic rest, regular diet, discharge today, follow up at [facility] with OB for prenatal care on 07/31/2017. Pt instructed to come back if she has signs of bleeding or labor  Secondary Diagnosis:	Placenta previa antepartum in second trimester  Goal:	Reached  Instructions for follow-up, activity and diet:	Pelvic rest, regular diet, discharge today, follow up at [facility] with OB for prenatal care on 07/31/2017. Pt instructed to come back if she has signs of bleeding or labor

## 2017-07-27 NOTE — DISCHARGE NOTE ANTEPARTUM - PATIENT PORTAL LINK FT
“You can access the Follow[employer] Patient Portal, offered by [facility], by registering with the following website: [email or web address]”

## 2017-07-27 NOTE — DISCHARGE NOTE OB - CARE PROVIDER_API CALL
DONALD, [facility]  [address], [place], [place] [address] [phone]  Phone: (   )    -  Fax: (   )    -

## 2017-07-27 NOTE — DISCHARGE NOTE OB - CARE PLAN
Principal Discharge DX:	24 weeks gestation of pregnancy  Goal:	Reached  Instructions for follow-up, activity and diet:	Pelvic rest, regular diet, discharge today, follow up at [facility] with OB for prenatal care on 07/31/2017. Pt instructed to come back if she has signs of bleeding or labor  Secondary Diagnosis:	Circumvallate placenta during pregnancy in second trimester, antepartum  Goal:	reached  Instructions for follow-up, activity and diet:	Pelvic rest, regular diet, discharge today, follow up at [facility] with OB for prenatal care on 07/31/2017. Pt instructed to come back if she has signs of bleeding or labor  Secondary Diagnosis:	Placenta previa antepartum in second trimester  Goal:	Reached  Instructions for follow-up, activity and diet:	Pelvic rest, regular diet, discharge today, follow up at [facility] with OB for prenatal care on 07/31/2017. Pt instructed to come back if she has signs of bleeding or labor

## 2017-07-27 NOTE — DISCHARGE NOTE ANTEPARTUM - CARE PROVIDER_API CALL
DONALD, [facility]  [address], [place], [place] [address] [phone]  Phone: (   )    -  Fax: (   )    -

## 2017-07-27 NOTE — DISCHARGE NOTE OB - MEDICATION SUMMARY - MEDICATIONS TO TAKE
I will START or STAY ON the medications listed below when I get home from the hospital:    Prenatal Multivitamins with Folic Acid 0.4 mg oral capsule  -- 1 cap(s) by mouth once a day  -- May discolor urine or feces.  Take with food.    -- Indication: For supplement

## 2017-07-27 NOTE — DISCHARGE NOTE ANTEPARTUM - PLAN OF CARE
Reached Pelvic rest, regular diet, discharge today, follow up at [facility] with OB for prenatal care on 07/31/2017. Pt instructed to come back if she has signs of bleeding or labor reached Pelvic rest, regular diet, discharge today, follow up at [facility] with OB for prenatal care on 07/31/2017. Pt instructed to come back if she has signs of bleeding or labor Pelvic rest, regular diet, discharge today, follow up at [facility] with OB for prenatal care on 07/31/2017. Pt instructed to come back if she has signs of bleeding or labor

## 2017-07-27 NOTE — DISCHARGE NOTE OB - PATIENT PORTAL LINK FT
“You can access the Follow[employer] Patient Portal, offered by [facility], by registering with the following website: [email or web address]”

## 2017-07-27 NOTE — DISCHARGE NOTE OB - HOSPITAL COURSE
This patient is a  21 yo , 25weeks who was admitted with vaginal bleeding and contractions. Pt has a history of placenta previa by sono, She was given 2 doses of betamethasone for fetal lung maturity. Pt was observed, FHR was monitered. During her stay, the bleeding resolved, contractions resolved . Patient was transferred to postpartum floor and monitored. Pt is stable, ambulating, voiding, evacuating. Patient is medically ready for discharge. Instructed to Follow up at [facility] on 2017 for her prenatal OB consult. Pt instructed to come back if she has symptoms of vaginal bleeding, contractions or labor.

## 2017-07-27 NOTE — PROGRESS NOTE ADULT - SUBJECTIVE AND OBJECTIVE BOX
FERNY PRINCE  Patient is a 22y old  LMP 2017 HEYDI: 2017 at Gestational age 25weeks 0 days. Pt known for diagnosis of partial placental previa and admitted for her 4th episode of bleeding. Patient is being kept under observation and seen by M specialist. She reports no vaginal bleeding or contractions, reports adequate fetal movements. She denies any complaint at this moment. She denies any lightheadedness, headache, vomiting.    Vital Signs:  Vital Signs Last 24 Hrs  T(C): 36.6 (2017 22:00), Max: 37.1 (2017 12:30)  T(F): 97.9 (2017 22:00), Max: 98.7 (2017 12:30)  HR: 79 (2017 22:00) (79 - 86)  BP: 99/52 (2017 22:00) (95/49 - 110/67)  BP(mean): --  RR: 18 (2017 22:00) (18 - 18)  SpO2: --    Physical Exam:  General: NAD  HEENT: Nystagmus noted, chronic  CVS: RRR, +S1/S2  Lungs: CTAB, no wheeze, ronchi or rales.   Breast: No tenderness or abnormal discharge.  Abdomen: +BS, soft, NT.  Ext: No edema or calf tenderness.     No new Labs:    MEDICATIONS  (STANDING):  sodium chloride 0.9% lock flush 3 milliLiter(s) IV Push every 8 hours FERNY PRINCE  Patient is a 22y old  LMP 2017 HEYDI: 2017 at Gestational age 25weeks 0 days. Pt known for diagnosis of partial placental previa and admitted for her 4th episode of bleeding. Patient is being kept under observation and seen by MFM specialist. She reports no vaginal bleeding or contractions, reports adequate fetal movements. She denies any complaint at this moment. She denies any lightheadedness, headache, vomiting.    Vital Signs:  Vital Signs Last 24 Hrs  T(C): 36.6 (2017 22:00), Max: 37.1 (2017 12:30)  T(F): 97.9 (2017 22:00), Max: 98.7 (2017 12:30)  HR: 79 (2017 22:00) (79 - 86)  BP: 99/52 (2017 22:00) (95/49 - 110/67)  BP(mean): --  RR: 18 (2017 22:00) (18 - 18)  SpO2: --    Physical Exam:  General: NAD  HEENT: Nystagmus noted, chronic  CVS: RRR, +S1/S2  Lungs: CTAB, no wheeze, ronchi or rales.   Breast: No tenderness or abnormal discharge.  Abdomen: +BS, soft, NT.  Ext: No edema or calf tenderness.     No new Labs:    NST (2017 @00:52):  Baseline: 135bpm  Variability: Moderate  Accelerations: Present  Decelerations: Absent  No Contractions.    MEDICATIONS  (STANDING):  sodium chloride 0.9% lock flush 3 milliLiter(s) IV Push every 8 hours

## 2017-07-27 NOTE — DISCHARGE NOTE OB - PROVIDER TOKENS
FREE:[LAST:[HR],FIRST:[Clinic],PHONE:[(   )    -],FAX:[(   )    -],ADDRESS:[[place] [id number] [address], [place] [id number] [phone]]]

## 2017-07-27 NOTE — DISCHARGE NOTE ANTEPARTUM - MEDICATION SUMMARY - MEDICATIONS TO TAKE
I will START or STAY ON the medications listed below when I get home from the hospital:    Prenatal Multivitamins with Folic Acid 0.4 mg oral capsule  -- 1 cap(s) by mouth once a day  -- May discolor urine or feces.  Take with food.    -- Indication: For 24 weeks gestation of pregnancy

## 2017-07-27 NOTE — PROGRESS NOTE ADULT - PROBLEM SELECTOR PLAN 1
Evaluate discharge today  Counseling for bed rest and avoidance of sexual intercourse.  Encourage patient to come back to the hospital in the event of bleeding.

## 2017-07-27 NOTE — DISCHARGE NOTE OB - SECONDARY DIAGNOSIS.
Circumvallate placenta during pregnancy in second trimester, antepartum Placenta previa antepartum in second trimester

## 2017-08-09 ENCOUNTER — RECORD ABSTRACTING (OUTPATIENT)
Age: 22
End: 2017-08-09

## 2017-08-09 DIAGNOSIS — Z78.9 OTHER SPECIFIED HEALTH STATUS: ICD-10-CM

## 2017-08-09 DIAGNOSIS — Z36 ENCOUNTER FOR ANTENATAL SCREENING OF MOTHER: ICD-10-CM

## 2017-08-09 DIAGNOSIS — Z87.59 PERSONAL HISTORY OF OTHER COMPLICATIONS OF PREGNANCY, CHILDBIRTH AND THE PUERPERIUM: ICD-10-CM

## 2017-08-09 DIAGNOSIS — Z83.3 FAMILY HISTORY OF DIABETES MELLITUS: ICD-10-CM

## 2017-08-10 ENCOUNTER — APPOINTMENT (OUTPATIENT)
Dept: ANTEPARTUM | Facility: CLINIC | Age: 22
End: 2017-08-10
Payer: MEDICAID

## 2017-08-10 ENCOUNTER — INPATIENT (INPATIENT)
Facility: HOSPITAL | Age: 22
LOS: 0 days | Discharge: HOME | End: 2017-08-10
Attending: OBSTETRICS & GYNECOLOGY | Admitting: OBSTETRICS & GYNECOLOGY

## 2017-08-10 ENCOUNTER — OUTPATIENT (OUTPATIENT)
Dept: OUTPATIENT SERVICES | Facility: HOSPITAL | Age: 22
LOS: 1 days | End: 2017-08-10
Payer: MEDICAID

## 2017-08-10 ENCOUNTER — INPATIENT (INPATIENT)
Facility: HOSPITAL | Age: 22
LOS: 1 days | Discharge: HOME | End: 2017-08-12
Attending: OBSTETRICS & GYNECOLOGY | Admitting: OBSTETRICS & GYNECOLOGY

## 2017-08-10 ENCOUNTER — ASOB RESULT (OUTPATIENT)
Age: 22
End: 2017-08-10

## 2017-08-10 VITALS — HEIGHT: 61 IN | WEIGHT: 114.64 LBS

## 2017-08-10 DIAGNOSIS — O03.9 COMPLETE OR UNSPECIFIED SPONTANEOUS ABORTION WITHOUT COMPLICATION: Chronic | ICD-10-CM

## 2017-08-10 DIAGNOSIS — O26.893 OTHER SPECIFIED PREGNANCY RELATED CONDITIONS, THIRD TRIMESTER: ICD-10-CM

## 2017-08-10 DIAGNOSIS — O47.02 FALSE LABOR BEFORE 37 COMPLETED WEEKS OF GESTATION, SECOND TRIMESTER: ICD-10-CM

## 2017-08-10 DIAGNOSIS — Z3A.00 WEEKS OF GESTATION OF PREGNANCY NOT SPECIFIED: ICD-10-CM

## 2017-08-10 DIAGNOSIS — O26.899 OTHER SPECIFIED PREGNANCY RELATED CONDITIONS, UNSPECIFIED TRIMESTER: ICD-10-CM

## 2017-08-10 LAB
ALBUMIN SERPL ELPH-MCNC: 3 G/DL — LOW (ref 3.3–5)
ALP SERPL-CCNC: 91 U/L — SIGNIFICANT CHANGE UP (ref 40–120)
ALT FLD-CCNC: 9 U/L — SIGNIFICANT CHANGE UP (ref 4–33)
AST SERPL-CCNC: 13 U/L — SIGNIFICANT CHANGE UP (ref 4–32)
BASOPHILS # BLD AUTO: 0 K/UL — SIGNIFICANT CHANGE UP (ref 0–0.2)
BASOPHILS NFR BLD AUTO: 0.2 % — SIGNIFICANT CHANGE UP (ref 0–2)
BILIRUB SERPL-MCNC: 0.2 MG/DL — SIGNIFICANT CHANGE UP (ref 0.2–1.2)
BLD GP AB SCN SERPL QL: NEGATIVE — SIGNIFICANT CHANGE UP
BLD GP AB SCN SERPL QL: SIGNIFICANT CHANGE UP
BUN SERPL-MCNC: 5 MG/DL — LOW (ref 7–23)
CALCIUM SERPL-MCNC: 8.3 MG/DL — LOW (ref 8.4–10.5)
CHLORIDE SERPL-SCNC: 105 MMOL/L — SIGNIFICANT CHANGE UP (ref 98–107)
CO2 SERPL-SCNC: 21 MMOL/L — LOW (ref 22–31)
CREAT SERPL-MCNC: 0.32 MG/DL — LOW (ref 0.5–1.3)
EOSINOPHIL # BLD AUTO: 0.1 K/UL — SIGNIFICANT CHANGE UP (ref 0–0.5)
EOSINOPHIL NFR BLD AUTO: 1.2 % — SIGNIFICANT CHANGE UP (ref 0–6)
GLUCOSE SERPL-MCNC: 70 MG/DL — SIGNIFICANT CHANGE UP (ref 70–99)
HCT VFR BLD CALC: 31.8 % — LOW (ref 34.5–45)
HCT VFR BLD CALC: 33.8 % — LOW (ref 37–47)
HGB BLD-MCNC: 10.3 G/DL — LOW (ref 11.5–15.5)
HGB BLD-MCNC: 10.9 G/DL — LOW (ref 12–16)
LYMPHOCYTES # BLD AUTO: 2.1 K/UL — SIGNIFICANT CHANGE UP (ref 1–4.8)
LYMPHOCYTES # BLD AUTO: 21.9 % — SIGNIFICANT CHANGE UP (ref 20–55)
MCHC RBC-ENTMCNC: 30.1 PG — SIGNIFICANT CHANGE UP (ref 27–31)
MCHC RBC-ENTMCNC: 30.7 PG — SIGNIFICANT CHANGE UP (ref 27–34)
MCHC RBC-ENTMCNC: 32.2 G/DL — SIGNIFICANT CHANGE UP (ref 32–36)
MCHC RBC-ENTMCNC: 32.4 % — SIGNIFICANT CHANGE UP (ref 32–36)
MCV RBC AUTO: 93.4 FL — SIGNIFICANT CHANGE UP (ref 81–99)
MCV RBC AUTO: 94.9 FL — SIGNIFICANT CHANGE UP (ref 80–100)
MONOCYTES # BLD AUTO: 1 K/UL — HIGH (ref 0–0.8)
MONOCYTES NFR BLD AUTO: 9.9 % — SIGNIFICANT CHANGE UP (ref 3–10)
NEUTROPHILS # BLD AUTO: 6.4 K/UL — SIGNIFICANT CHANGE UP (ref 1.8–8)
NEUTROPHILS NFR BLD AUTO: 66.3 % — SIGNIFICANT CHANGE UP (ref 37–73)
NRBC # FLD: 0 — SIGNIFICANT CHANGE UP
PLATELET # BLD AUTO: 145 K/UL — LOW (ref 150–400)
PLATELET # BLD AUTO: 169 K/UL — SIGNIFICANT CHANGE UP (ref 150–400)
PMV BLD: 11.6 FL — SIGNIFICANT CHANGE UP (ref 7–13)
POTASSIUM SERPL-MCNC: 3.4 MMOL/L — LOW (ref 3.5–5.3)
POTASSIUM SERPL-SCNC: 3.4 MMOL/L — LOW (ref 3.5–5.3)
PROT SERPL-MCNC: 6.3 G/DL — SIGNIFICANT CHANGE UP (ref 6–8.3)
RBC # BLD: 3.35 M/UL — LOW (ref 3.8–5.2)
RBC # BLD: 3.62 M/UL — LOW (ref 4.4–5.2)
RBC # FLD: 12.3 % — SIGNIFICANT CHANGE UP (ref 10.3–14.5)
RBC # FLD: 12.6 % — SIGNIFICANT CHANGE UP (ref 11–15.6)
RH IG SCN BLD-IMP: POSITIVE — SIGNIFICANT CHANGE UP
SODIUM SERPL-SCNC: 137 MMOL/L — SIGNIFICANT CHANGE UP (ref 135–145)
TYPE + AB SCN PNL BLD: SIGNIFICANT CHANGE UP
WBC # BLD: 8.29 K/UL — SIGNIFICANT CHANGE UP (ref 3.8–10.5)
WBC # BLD: 9.6 K/UL — SIGNIFICANT CHANGE UP (ref 4.8–10.8)
WBC # FLD AUTO: 8.29 K/UL — SIGNIFICANT CHANGE UP (ref 3.8–10.5)
WBC # FLD AUTO: 9.6 K/UL — SIGNIFICANT CHANGE UP (ref 4.8–10.8)

## 2017-08-10 PROCEDURE — 76805 OB US >/= 14 WKS SNGL FETUS: CPT | Mod: 26

## 2017-08-10 RX ORDER — CITRIC ACID/SODIUM CITRATE 300-500 MG
30 SOLUTION, ORAL ORAL ONCE
Qty: 0 | Refills: 0 | Status: DISCONTINUED | OUTPATIENT
Start: 2017-08-10 | End: 2017-08-10

## 2017-08-10 RX ORDER — SODIUM CHLORIDE 9 MG/ML
3 INJECTION INTRAMUSCULAR; INTRAVENOUS; SUBCUTANEOUS EVERY 8 HOURS
Qty: 0 | Refills: 0 | Status: DISCONTINUED | OUTPATIENT
Start: 2017-08-10 | End: 2017-08-10

## 2017-08-10 RX ORDER — SODIUM CHLORIDE 9 MG/ML
1000 INJECTION, SOLUTION INTRAVENOUS
Qty: 0 | Refills: 0 | Status: DISCONTINUED | OUTPATIENT
Start: 2017-08-10 | End: 2017-08-12

## 2017-08-10 RX ORDER — SODIUM CHLORIDE 9 MG/ML
1000 INJECTION, SOLUTION INTRAVENOUS
Qty: 0 | Refills: 0 | Status: DISCONTINUED | OUTPATIENT
Start: 2017-08-10 | End: 2017-08-10

## 2017-08-10 RX ORDER — SODIUM CHLORIDE 9 MG/ML
1000 INJECTION, SOLUTION INTRAVENOUS ONCE
Qty: 0 | Refills: 0 | Status: DISCONTINUED | OUTPATIENT
Start: 2017-08-10 | End: 2017-08-10

## 2017-08-10 RX ORDER — OXYTOCIN 10 UNIT/ML
333.33 VIAL (ML) INJECTION
Qty: 20 | Refills: 0 | Status: DISCONTINUED | OUTPATIENT
Start: 2017-08-10 | End: 2017-08-10

## 2017-08-10 RX ORDER — ACETAMINOPHEN 500 MG
650 TABLET ORAL ONCE
Qty: 0 | Refills: 0 | Status: COMPLETED | OUTPATIENT
Start: 2017-08-10 | End: 2017-08-10

## 2017-08-10 RX ORDER — SODIUM CHLORIDE 9 MG/ML
500 INJECTION, SOLUTION INTRAVENOUS ONCE
Qty: 0 | Refills: 0 | Status: COMPLETED | OUTPATIENT
Start: 2017-08-10 | End: 2017-08-10

## 2017-08-10 RX ADMIN — SODIUM CHLORIDE 125 MILLILITER(S): 9 INJECTION, SOLUTION INTRAVENOUS at 23:10

## 2017-08-10 RX ADMIN — Medication 650 MILLIGRAM(S): at 17:02

## 2017-08-10 RX ADMIN — SODIUM CHLORIDE 125 MILLILITER(S): 9 INJECTION, SOLUTION INTRAVENOUS at 14:07

## 2017-08-10 RX ADMIN — Medication 650 MILLIGRAM(S): at 18:30

## 2017-08-10 RX ADMIN — SODIUM CHLORIDE 1000 MILLILITER(S): 9 INJECTION, SOLUTION INTRAVENOUS at 12:35

## 2017-08-11 ENCOUNTER — TRANSCRIPTION ENCOUNTER (OUTPATIENT)
Age: 22
End: 2017-08-11

## 2017-08-11 NOTE — DISCHARGE NOTE ANTEPARTUM - MEDICATION SUMMARY - MEDICATIONS TO TAKE
I will START or STAY ON the medications listed below when I get home from the hospital:    Prenatal Multivitamins with Folic Acid 0.4 mg oral capsule  -- 1 cap(s) by mouth once a day  -- May discolor urine or feces.  Take with food.    -- Indication: For pregnancy

## 2017-08-11 NOTE — DISCHARGE NOTE ANTEPARTUM - PROVIDER TOKENS
FREE:[LAST:[-],PHONE:[(   )    -],FAX:[(   )    -],ADDRESS:[Please follow up with us in one week, if you wish to establish care with our clinic, or with your private obgyn in one week.     Your visit will be at the Ambulatory Clinic Unit, [facility]: Oncology Building, 3rd floor.    Please schedule an appointment (PHONE # [phone], call after 9 am) or visit during the walk-in hours as follows: MONDAY 3-6 PM, WEDNESDAY 3-6 PM, FRIDAY 9-11 AM; 1-3 PM    As for an initial prenatal visit.]]

## 2017-08-11 NOTE — DISCHARGE NOTE ANTEPARTUM - CARE PROVIDER_API CALL
-,   Please follow up with us in one week, if you wish to establish care with our clinic, or with your private obgyn in one week.     Your visit will be at the Ambulatory Clinic Unit, [facility]: [facility] Building, 3rd floor.    Please schedule an appointment (PHONE # [phone], call after 9 am) or visit during the walk-in hours as follows: MONDAY 3-6 PM, WEDNESDAY 3-6 PM, FRIDAY 9-11 AM; 1-3 PM    As for an initial prenatal visit.  Phone: (   )    -  Fax: (   )    -

## 2017-08-11 NOTE — DISCHARGE NOTE ANTEPARTUM - HOSPITAL COURSE
Patient transferred from [facility] for vaginal bleeding with known placental previa.  Had received betamethasone for fetal lung maturation 7/24- 7/25.  Patient assessed by and followed by MFM, repeat betamethasone thought to not be indicated at this point.  Fetal and maternal status  monitored.  Patient with few episodes of small vaginal bleeding while in house.  Patient was assessed by NICU and expectations discussed for potential delivery at this gestational age.    Following >24 hrs without bleeding and stable fetal and maternal status patient discharged home with precautions for close outpatient follow up.

## 2017-08-11 NOTE — DISCHARGE NOTE ANTEPARTUM - PLAN OF CARE
wellness You were observed in the hospital for vaginal bleeding for a placenta previa.  After discharge, call your ObGyn if you have vaginal bleeding, contractions, loss of fluid like you broke your water or decreased fetal movement.  Also call if you have fevers or chills.  Follow up with your ObGyn within one week.

## 2017-08-11 NOTE — DISCHARGE NOTE ANTEPARTUM - PATIENT PORTAL LINK FT
“You can access the Follow[facility] Patient Portal, offered by [facility], by registering with the following website: [email or web address]”

## 2017-08-11 NOTE — DISCHARGE NOTE ANTEPARTUM - CARE PLAN
Principal Discharge DX:	Placenta previa antepartum in third trimester  Goal:	wellness  Instructions for follow-up, activity and diet:	You were observed in the hospital for vaginal bleeding for a placenta previa.  After discharge, call your ObGyn if you have vaginal bleeding, contractions, loss of fluid like you broke your water or decreased fetal movement.  Also call if you have fevers or chills.  Follow up with your ObGyn within one week.

## 2017-08-12 VITALS
DIASTOLIC BLOOD PRESSURE: 54 MMHG | SYSTOLIC BLOOD PRESSURE: 111 MMHG | TEMPERATURE: 99 F | OXYGEN SATURATION: 98 % | RESPIRATION RATE: 17 BRPM | HEART RATE: 95 BPM

## 2017-08-12 LAB — T PALLIDUM AB TITR SER: NEGATIVE — SIGNIFICANT CHANGE UP

## 2017-08-12 RX ADMIN — Medication 1 TABLET(S): at 14:00

## 2017-08-13 ENCOUNTER — OUTPATIENT (OUTPATIENT)
Dept: OUTPATIENT SERVICES | Facility: HOSPITAL | Age: 22
LOS: 1 days | End: 2017-08-13
Payer: MEDICAID

## 2017-08-13 ENCOUNTER — INPATIENT (INPATIENT)
Facility: HOSPITAL | Age: 22
LOS: 0 days | Discharge: HOME | End: 2017-08-14
Attending: OBSTETRICS & GYNECOLOGY | Admitting: OBSTETRICS & GYNECOLOGY

## 2017-08-13 DIAGNOSIS — O03.9 COMPLETE OR UNSPECIFIED SPONTANEOUS ABORTION WITHOUT COMPLICATION: Chronic | ICD-10-CM

## 2017-08-13 DIAGNOSIS — O47.02 FALSE LABOR BEFORE 37 COMPLETED WEEKS OF GESTATION, SECOND TRIMESTER: ICD-10-CM

## 2017-08-13 LAB
APPEARANCE UR: CLEAR — SIGNIFICANT CHANGE UP
BASOPHILS # BLD AUTO: 0 K/UL — SIGNIFICANT CHANGE UP (ref 0–0.2)
BASOPHILS NFR BLD AUTO: 0.1 % — SIGNIFICANT CHANGE UP (ref 0–2)
BILIRUB UR-MCNC: NEGATIVE — SIGNIFICANT CHANGE UP
BLD GP AB SCN SERPL QL: SIGNIFICANT CHANGE UP
COLOR SPEC: SIGNIFICANT CHANGE UP
DIFF PNL FLD: ABNORMAL
EOSINOPHIL # BLD AUTO: 0.2 K/UL — SIGNIFICANT CHANGE UP (ref 0–0.5)
EOSINOPHIL NFR BLD AUTO: 1.9 % — SIGNIFICANT CHANGE UP (ref 0–6)
GLUCOSE UR QL: NEGATIVE MG/DL — SIGNIFICANT CHANGE UP
HCT VFR BLD CALC: 32.9 % — LOW (ref 37–47)
HGB BLD-MCNC: 11.2 G/DL — LOW (ref 12–16)
KETONES UR-MCNC: NEGATIVE — SIGNIFICANT CHANGE UP
LEUKOCYTE ESTERASE UR-ACNC: NEGATIVE — SIGNIFICANT CHANGE UP
LYMPHOCYTES # BLD AUTO: 2.4 K/UL — SIGNIFICANT CHANGE UP (ref 1–4.8)
LYMPHOCYTES # BLD AUTO: 27 % — SIGNIFICANT CHANGE UP (ref 20–55)
MCHC RBC-ENTMCNC: 31 PG — SIGNIFICANT CHANGE UP (ref 27–31)
MCHC RBC-ENTMCNC: 34 G/DL — SIGNIFICANT CHANGE UP (ref 32–36)
MCV RBC AUTO: 91.1 FL — SIGNIFICANT CHANGE UP (ref 81–99)
MONOCYTES # BLD AUTO: 0.8 K/UL — SIGNIFICANT CHANGE UP (ref 0–0.8)
MONOCYTES NFR BLD AUTO: 9.4 % — SIGNIFICANT CHANGE UP (ref 3–10)
NEUTROPHILS # BLD AUTO: 5.5 K/UL — SIGNIFICANT CHANGE UP (ref 1.8–8)
NEUTROPHILS NFR BLD AUTO: 61.2 % — SIGNIFICANT CHANGE UP (ref 37–73)
NITRITE UR-MCNC: NEGATIVE — SIGNIFICANT CHANGE UP
PH UR: 6.5 — SIGNIFICANT CHANGE UP (ref 5–8)
PLATELET # BLD AUTO: 168 K/UL — SIGNIFICANT CHANGE UP (ref 150–400)
PROT UR-MCNC: NEGATIVE MG/DL — SIGNIFICANT CHANGE UP
RBC # BLD: 3.61 M/UL — LOW (ref 4.4–5.2)
RBC # FLD: 12.4 % — SIGNIFICANT CHANGE UP (ref 11–15.6)
SP GR SPEC: 1.01 — SIGNIFICANT CHANGE UP (ref 1.01–1.02)
TYPE + AB SCN PNL BLD: SIGNIFICANT CHANGE UP
UROBILINOGEN FLD QL: NEGATIVE MG/DL — SIGNIFICANT CHANGE UP
WBC # BLD: 9 K/UL — SIGNIFICANT CHANGE UP (ref 4.8–10.8)
WBC # FLD AUTO: 9 K/UL — SIGNIFICANT CHANGE UP (ref 4.8–10.8)

## 2017-08-13 RX ORDER — CITRIC ACID/SODIUM CITRATE 300-500 MG
30 SOLUTION, ORAL ORAL ONCE
Qty: 0 | Refills: 0 | Status: DISCONTINUED | OUTPATIENT
Start: 2017-08-13 | End: 2017-08-14

## 2017-08-13 RX ORDER — OXYTOCIN 10 UNIT/ML
333.33 VIAL (ML) INJECTION
Qty: 20 | Refills: 0 | Status: DISCONTINUED | OUTPATIENT
Start: 2017-08-13 | End: 2017-08-14

## 2017-08-13 RX ORDER — SODIUM CHLORIDE 9 MG/ML
1000 INJECTION, SOLUTION INTRAVENOUS
Qty: 0 | Refills: 0 | Status: DISCONTINUED | OUTPATIENT
Start: 2017-08-13 | End: 2017-08-14

## 2017-08-13 RX ORDER — SODIUM CHLORIDE 9 MG/ML
500 INJECTION, SOLUTION INTRAVENOUS ONCE
Qty: 0 | Refills: 0 | Status: COMPLETED | OUTPATIENT
Start: 2017-08-13 | End: 2017-08-13

## 2017-08-13 RX ORDER — SODIUM CHLORIDE 9 MG/ML
1000 INJECTION, SOLUTION INTRAVENOUS ONCE
Qty: 0 | Refills: 0 | Status: DISCONTINUED | OUTPATIENT
Start: 2017-08-13 | End: 2017-08-13

## 2017-08-13 RX ADMIN — SODIUM CHLORIDE 125 MILLILITER(S): 9 INJECTION, SOLUTION INTRAVENOUS at 22:41

## 2017-08-13 RX ADMIN — SODIUM CHLORIDE 1500 MILLILITER(S): 9 INJECTION, SOLUTION INTRAVENOUS at 22:20

## 2017-08-14 ENCOUNTER — APPOINTMENT (OUTPATIENT)
Dept: ANTEPARTUM | Facility: CLINIC | Age: 22
End: 2017-08-14
Payer: MEDICAID

## 2017-08-14 ENCOUNTER — INPATIENT (INPATIENT)
Facility: HOSPITAL | Age: 22
LOS: 38 days | Discharge: HOME | End: 2017-09-22
Attending: OBSTETRICS & GYNECOLOGY | Admitting: OBSTETRICS & GYNECOLOGY
Payer: MEDICAID

## 2017-08-14 ENCOUNTER — ASOB RESULT (OUTPATIENT)
Age: 22
End: 2017-08-14

## 2017-08-14 VITALS
HEART RATE: 74 BPM | SYSTOLIC BLOOD PRESSURE: 105 MMHG | DIASTOLIC BLOOD PRESSURE: 68 MMHG | WEIGHT: 115.08 LBS | OXYGEN SATURATION: 98 % | TEMPERATURE: 208 F | RESPIRATION RATE: 18 BRPM

## 2017-08-14 DIAGNOSIS — Z3A.00 WEEKS OF GESTATION OF PREGNANCY NOT SPECIFIED: ICD-10-CM

## 2017-08-14 DIAGNOSIS — O26.899 OTHER SPECIFIED PREGNANCY RELATED CONDITIONS, UNSPECIFIED TRIMESTER: ICD-10-CM

## 2017-08-14 DIAGNOSIS — O03.9 COMPLETE OR UNSPECIFIED SPONTANEOUS ABORTION WITHOUT COMPLICATION: Chronic | ICD-10-CM

## 2017-08-14 LAB
APPEARANCE UR: CLEAR — SIGNIFICANT CHANGE UP
BACTERIA # UR AUTO: ABNORMAL
BASOPHILS # BLD AUTO: 0 K/UL — SIGNIFICANT CHANGE UP (ref 0–0.2)
BASOPHILS NFR BLD AUTO: 0.1 % — SIGNIFICANT CHANGE UP (ref 0–2)
BILIRUB UR-MCNC: NEGATIVE — SIGNIFICANT CHANGE UP
BLD GP AB SCN SERPL QL: NEGATIVE — SIGNIFICANT CHANGE UP
COLOR SPEC: YELLOW — SIGNIFICANT CHANGE UP
DIFF PNL FLD: ABNORMAL
EOSINOPHIL # BLD AUTO: 0.1 K/UL — SIGNIFICANT CHANGE UP (ref 0–0.5)
EOSINOPHIL NFR BLD AUTO: 1.3 % — SIGNIFICANT CHANGE UP (ref 0–6)
EPI CELLS # UR: SIGNIFICANT CHANGE UP
GLUCOSE UR QL: NEGATIVE — SIGNIFICANT CHANGE UP
HCT VFR BLD CALC: 28 % — LOW (ref 34.5–45)
HGB BLD-MCNC: 10 G/DL — LOW (ref 11.5–15.5)
KETONES UR-MCNC: NEGATIVE — SIGNIFICANT CHANGE UP
LEUKOCYTE ESTERASE UR-ACNC: NEGATIVE — SIGNIFICANT CHANGE UP
LYMPHOCYTES # BLD AUTO: 2.5 K/UL — SIGNIFICANT CHANGE UP (ref 1–3.3)
LYMPHOCYTES # BLD AUTO: 29.5 % — SIGNIFICANT CHANGE UP (ref 13–44)
MCHC RBC-ENTMCNC: 33.3 PG — SIGNIFICANT CHANGE UP (ref 27–34)
MCHC RBC-ENTMCNC: 35.5 GM/DL — SIGNIFICANT CHANGE UP (ref 32–36)
MCV RBC AUTO: 93.8 FL — SIGNIFICANT CHANGE UP (ref 80–100)
MONOCYTES # BLD AUTO: 0.8 K/UL — SIGNIFICANT CHANGE UP (ref 0–0.9)
MONOCYTES NFR BLD AUTO: 9.6 % — SIGNIFICANT CHANGE UP (ref 2–14)
NEUTROPHILS # BLD AUTO: 5 K/UL — SIGNIFICANT CHANGE UP (ref 1.8–7.4)
NEUTROPHILS NFR BLD AUTO: 59.4 % — SIGNIFICANT CHANGE UP (ref 43–77)
NITRITE UR-MCNC: NEGATIVE — SIGNIFICANT CHANGE UP
PH UR: 6 — SIGNIFICANT CHANGE UP (ref 5–8)
PLATELET # BLD AUTO: 140 K/UL — LOW (ref 150–400)
PROT UR-MCNC: NEGATIVE — SIGNIFICANT CHANGE UP
RBC # BLD: 2.99 M/UL — LOW (ref 3.8–5.2)
RBC # FLD: 11.4 % — SIGNIFICANT CHANGE UP (ref 10.3–14.5)
RBC CASTS # UR COMP ASSIST: ABNORMAL /HPF (ref 0–4)
RH IG SCN BLD-IMP: POSITIVE — SIGNIFICANT CHANGE UP
RH IG SCN BLD-IMP: POSITIVE — SIGNIFICANT CHANGE UP
RPR SERPL-ACNC: SIGNIFICANT CHANGE UP
SP GR SPEC: 1.01 — SIGNIFICANT CHANGE UP (ref 1.01–1.02)
T PALLIDUM AB TITR SER: NEGATIVE — SIGNIFICANT CHANGE UP
UROBILINOGEN FLD QL: NEGATIVE — SIGNIFICANT CHANGE UP
WBC # BLD: 8.4 K/UL — SIGNIFICANT CHANGE UP (ref 3.8–10.5)
WBC # FLD AUTO: 8.4 K/UL — SIGNIFICANT CHANGE UP (ref 3.8–10.5)
WBC UR QL: SIGNIFICANT CHANGE UP

## 2017-08-14 PROCEDURE — G0463: CPT

## 2017-08-14 PROCEDURE — 86901 BLOOD TYPING SEROLOGIC RH(D): CPT

## 2017-08-14 PROCEDURE — 36415 COLL VENOUS BLD VENIPUNCTURE: CPT

## 2017-08-14 PROCEDURE — 85027 COMPLETE CBC AUTOMATED: CPT

## 2017-08-14 PROCEDURE — 59025 FETAL NON-STRESS TEST: CPT

## 2017-08-14 PROCEDURE — 86592 SYPHILIS TEST NON-TREP QUAL: CPT

## 2017-08-14 PROCEDURE — 86900 BLOOD TYPING SEROLOGIC ABO: CPT

## 2017-08-14 PROCEDURE — 81001 URINALYSIS AUTO W/SCOPE: CPT

## 2017-08-14 PROCEDURE — 86850 RBC ANTIBODY SCREEN: CPT

## 2017-08-14 PROCEDURE — 59050 FETAL MONITOR W/REPORT: CPT

## 2017-08-14 PROCEDURE — 99222 1ST HOSP IP/OBS MODERATE 55: CPT | Mod: 25

## 2017-08-14 PROCEDURE — 76805 OB US >/= 14 WKS SNGL FETUS: CPT

## 2017-08-14 PROCEDURE — 59025 FETAL NON-STRESS TEST: CPT | Mod: 26

## 2017-08-14 PROCEDURE — 76815 OB US LIMITED FETUS(S): CPT

## 2017-08-14 RX ORDER — INDOMETHACIN 50 MG
100 CAPSULE ORAL ONCE
Qty: 0 | Refills: 0 | Status: DISCONTINUED | OUTPATIENT
Start: 2017-08-14 | End: 2017-08-14

## 2017-08-14 RX ORDER — INDOMETHACIN 50 MG
50 CAPSULE ORAL ONCE
Qty: 0 | Refills: 0 | Status: COMPLETED | OUTPATIENT
Start: 2017-08-14 | End: 2017-08-14

## 2017-08-14 RX ORDER — INDOMETHACIN 50 MG
25 CAPSULE ORAL EVERY 6 HOURS
Qty: 0 | Refills: 0 | Status: DISCONTINUED | OUTPATIENT
Start: 2017-08-14 | End: 2017-08-15

## 2017-08-14 RX ADMIN — Medication 25 MILLIGRAM(S): at 13:44

## 2017-08-14 RX ADMIN — Medication 25 MILLIGRAM(S): at 07:11

## 2017-08-14 RX ADMIN — Medication 25 MILLIGRAM(S): at 23:53

## 2017-08-14 RX ADMIN — Medication 25 MILLIGRAM(S): at 17:40

## 2017-08-14 RX ADMIN — Medication 50 MILLIGRAM(S): at 01:00

## 2017-08-14 RX ADMIN — Medication 1 TABLET(S): at 13:43

## 2017-08-15 DIAGNOSIS — O26.893 OTHER SPECIFIED PREGNANCY RELATED CONDITIONS, THIRD TRIMESTER: ICD-10-CM

## 2017-08-15 PROCEDURE — 59025 FETAL NON-STRESS TEST: CPT | Mod: 26

## 2017-08-15 PROCEDURE — 99221 1ST HOSP IP/OBS SF/LOW 40: CPT

## 2017-08-15 PROCEDURE — 99231 SBSQ HOSP IP/OBS SF/LOW 25: CPT | Mod: 25

## 2017-08-15 RX ADMIN — Medication 25 MILLIGRAM(S): at 05:48

## 2017-08-15 RX ADMIN — Medication 1 TABLET(S): at 12:02

## 2017-08-16 PROCEDURE — G0463: CPT

## 2017-08-16 PROCEDURE — 59025 FETAL NON-STRESS TEST: CPT | Mod: 26

## 2017-08-16 PROCEDURE — 86850 RBC ANTIBODY SCREEN: CPT

## 2017-08-16 PROCEDURE — 86900 BLOOD TYPING SEROLOGIC ABO: CPT

## 2017-08-16 PROCEDURE — 85027 COMPLETE CBC AUTOMATED: CPT

## 2017-08-16 PROCEDURE — 86901 BLOOD TYPING SEROLOGIC RH(D): CPT

## 2017-08-16 PROCEDURE — 59050 FETAL MONITOR W/REPORT: CPT

## 2017-08-16 PROCEDURE — 99231 SBSQ HOSP IP/OBS SF/LOW 25: CPT | Mod: 25

## 2017-08-16 PROCEDURE — 36415 COLL VENOUS BLD VENIPUNCTURE: CPT

## 2017-08-16 PROCEDURE — T1013: CPT

## 2017-08-16 RX ADMIN — Medication 1 TABLET(S): at 12:53

## 2017-08-17 ENCOUNTER — APPOINTMENT (OUTPATIENT)
Dept: ANTEPARTUM | Facility: CLINIC | Age: 22
End: 2017-08-17

## 2017-08-17 ENCOUNTER — APPOINTMENT (OUTPATIENT)
Dept: MATERNAL FETAL MEDICINE | Facility: CLINIC | Age: 22
End: 2017-08-17

## 2017-08-17 LAB
BLD GP AB SCN SERPL QL: NEGATIVE — SIGNIFICANT CHANGE UP
RH IG SCN BLD-IMP: POSITIVE — SIGNIFICANT CHANGE UP

## 2017-08-17 PROCEDURE — 99231 SBSQ HOSP IP/OBS SF/LOW 25: CPT | Mod: 25

## 2017-08-17 PROCEDURE — 59025 FETAL NON-STRESS TEST: CPT | Mod: 26,76,59

## 2017-08-17 RX ADMIN — Medication 1 TABLET(S): at 15:13

## 2017-08-18 ENCOUNTER — APPOINTMENT (OUTPATIENT)
Dept: ANTEPARTUM | Facility: CLINIC | Age: 22
End: 2017-08-18

## 2017-08-18 ENCOUNTER — ASOB RESULT (OUTPATIENT)
Age: 22
End: 2017-08-18

## 2017-08-18 DIAGNOSIS — O47.02 FALSE LABOR BEFORE 37 COMPLETED WEEKS OF GESTATION, SECOND TRIMESTER: ICD-10-CM

## 2017-08-18 PROCEDURE — 99231 SBSQ HOSP IP/OBS SF/LOW 25: CPT

## 2017-08-18 PROCEDURE — 76817 TRANSVAGINAL US OBSTETRIC: CPT | Mod: 26

## 2017-08-18 PROCEDURE — 76819 FETAL BIOPHYS PROFIL W/O NST: CPT | Mod: 26,59

## 2017-08-18 PROCEDURE — 59025 FETAL NON-STRESS TEST: CPT | Mod: 26,59

## 2017-08-18 RX ADMIN — Medication 1 TABLET(S): at 11:58

## 2017-08-19 PROCEDURE — 99231 SBSQ HOSP IP/OBS SF/LOW 25: CPT

## 2017-08-19 PROCEDURE — 59025 FETAL NON-STRESS TEST: CPT | Mod: 26,59

## 2017-08-19 RX ADMIN — Medication 1 TABLET(S): at 12:50

## 2017-08-20 PROCEDURE — 99231 SBSQ HOSP IP/OBS SF/LOW 25: CPT

## 2017-08-20 PROCEDURE — 59025 FETAL NON-STRESS TEST: CPT | Mod: 26

## 2017-08-20 RX ADMIN — Medication 1 TABLET(S): at 12:08

## 2017-08-21 ENCOUNTER — ASOB RESULT (OUTPATIENT)
Age: 22
End: 2017-08-21

## 2017-08-21 ENCOUNTER — APPOINTMENT (OUTPATIENT)
Dept: ANTEPARTUM | Facility: CLINIC | Age: 22
End: 2017-08-21

## 2017-08-21 PROCEDURE — 59025 FETAL NON-STRESS TEST: CPT | Mod: 26,59

## 2017-08-21 PROCEDURE — 99231 SBSQ HOSP IP/OBS SF/LOW 25: CPT

## 2017-08-21 PROCEDURE — 76819 FETAL BIOPHYS PROFIL W/O NST: CPT | Mod: 26

## 2017-08-21 RX ADMIN — Medication 1 TABLET(S): at 13:06

## 2017-08-22 PROCEDURE — 59025 FETAL NON-STRESS TEST: CPT | Mod: 26

## 2017-08-22 PROCEDURE — 99231 SBSQ HOSP IP/OBS SF/LOW 25: CPT

## 2017-08-22 RX ADMIN — Medication 1 TABLET(S): at 12:17

## 2017-08-22 NOTE — DIETITIAN INITIAL EVALUATION ADULT. - OTHER INFO
Pt is a  at 28.6 weeks gestation with placenta previa. Pt denies nausea, vomiting, and diarrhea. Reports occasional constipation. Appetite good, has not food allergies or food preferences.

## 2017-08-22 NOTE — DIETITIAN INITIAL EVALUATION ADULT. - SIGNS/SYMPTOMS
Prescription approved per [facility] Refill Protocol.  Sveta Farooq RN     3rd trimester pregnacny

## 2017-08-23 PROCEDURE — 99231 SBSQ HOSP IP/OBS SF/LOW 25: CPT

## 2017-08-23 RX ADMIN — Medication 1 TABLET(S): at 13:02

## 2017-08-24 PROCEDURE — 59025 FETAL NON-STRESS TEST: CPT | Mod: 26

## 2017-08-24 PROCEDURE — 99231 SBSQ HOSP IP/OBS SF/LOW 25: CPT

## 2017-08-24 RX ORDER — HEPARIN SODIUM 5000 [USP'U]/ML
5000 INJECTION INTRAVENOUS; SUBCUTANEOUS EVERY 12 HOURS
Qty: 0 | Refills: 0 | Status: DISCONTINUED | OUTPATIENT
Start: 2017-08-24 | End: 2017-09-22

## 2017-08-24 RX ADMIN — Medication 1 TABLET(S): at 12:25

## 2017-08-24 RX ADMIN — HEPARIN SODIUM 5000 UNIT(S): 5000 INJECTION INTRAVENOUS; SUBCUTANEOUS at 17:59

## 2017-08-25 ENCOUNTER — APPOINTMENT (OUTPATIENT)
Dept: ANTEPARTUM | Facility: CLINIC | Age: 22
End: 2017-08-25

## 2017-08-25 PROCEDURE — 99231 SBSQ HOSP IP/OBS SF/LOW 25: CPT

## 2017-08-25 RX ADMIN — Medication 1 TABLET(S): at 11:55

## 2017-08-25 RX ADMIN — HEPARIN SODIUM 5000 UNIT(S): 5000 INJECTION INTRAVENOUS; SUBCUTANEOUS at 06:01

## 2017-08-25 RX ADMIN — HEPARIN SODIUM 5000 UNIT(S): 5000 INJECTION INTRAVENOUS; SUBCUTANEOUS at 17:47

## 2017-08-26 PROCEDURE — 99231 SBSQ HOSP IP/OBS SF/LOW 25: CPT

## 2017-08-26 RX ORDER — TETANUS TOXOID, REDUCED DIPHTHERIA TOXOID AND ACELLULAR PERTUSSIS VACCINE, ADSORBED 5; 2.5; 8; 8; 2.5 [IU]/.5ML; [IU]/.5ML; UG/.5ML; UG/.5ML; UG/.5ML
0.5 SUSPENSION INTRAMUSCULAR ONCE
Qty: 0 | Refills: 0 | Status: COMPLETED | OUTPATIENT
Start: 2017-08-26 | End: 2017-08-26

## 2017-08-26 RX ORDER — DOCUSATE SODIUM 100 MG
100 CAPSULE ORAL
Qty: 0 | Refills: 0 | Status: DISCONTINUED | OUTPATIENT
Start: 2017-08-26 | End: 2017-09-22

## 2017-08-26 RX ADMIN — Medication 1 TABLET(S): at 12:05

## 2017-08-26 RX ADMIN — TETANUS TOXOID, REDUCED DIPHTHERIA TOXOID AND ACELLULAR PERTUSSIS VACCINE, ADSORBED 0.5 MILLILITER(S): 5; 2.5; 8; 8; 2.5 SUSPENSION INTRAMUSCULAR at 17:30

## 2017-08-26 RX ADMIN — HEPARIN SODIUM 5000 UNIT(S): 5000 INJECTION INTRAVENOUS; SUBCUTANEOUS at 17:31

## 2017-08-26 RX ADMIN — Medication 100 MILLIGRAM(S): at 17:30

## 2017-08-26 RX ADMIN — HEPARIN SODIUM 5000 UNIT(S): 5000 INJECTION INTRAVENOUS; SUBCUTANEOUS at 05:34

## 2017-08-27 PROCEDURE — 99231 SBSQ HOSP IP/OBS SF/LOW 25: CPT

## 2017-08-27 RX ADMIN — Medication 1 TABLET(S): at 12:30

## 2017-08-27 RX ADMIN — Medication 100 MILLIGRAM(S): at 17:41

## 2017-08-27 RX ADMIN — HEPARIN SODIUM 5000 UNIT(S): 5000 INJECTION INTRAVENOUS; SUBCUTANEOUS at 05:39

## 2017-08-27 RX ADMIN — HEPARIN SODIUM 5000 UNIT(S): 5000 INJECTION INTRAVENOUS; SUBCUTANEOUS at 17:41

## 2017-08-27 RX ADMIN — Medication 100 MILLIGRAM(S): at 05:39

## 2017-08-28 ENCOUNTER — APPOINTMENT (OUTPATIENT)
Dept: ANTEPARTUM | Facility: CLINIC | Age: 22
End: 2017-08-28
Payer: MEDICAID

## 2017-08-28 ENCOUNTER — OUTPATIENT (OUTPATIENT)
Dept: OUTPATIENT SERVICES | Facility: HOSPITAL | Age: 22
LOS: 1 days | End: 2017-08-28
Payer: MEDICAID

## 2017-08-28 ENCOUNTER — ASOB RESULT (OUTPATIENT)
Age: 22
End: 2017-08-28

## 2017-08-28 DIAGNOSIS — O03.9 COMPLETE OR UNSPECIFIED SPONTANEOUS ABORTION WITHOUT COMPLICATION: Chronic | ICD-10-CM

## 2017-08-28 PROCEDURE — 99231 SBSQ HOSP IP/OBS SF/LOW 25: CPT

## 2017-08-28 PROCEDURE — 76816 OB US FOLLOW-UP PER FETUS: CPT

## 2017-08-28 PROCEDURE — 76818 FETAL BIOPHYS PROFILE W/NST: CPT | Mod: 26

## 2017-08-28 PROCEDURE — 76818 FETAL BIOPHYS PROFILE W/NST: CPT

## 2017-08-28 PROCEDURE — 76816 OB US FOLLOW-UP PER FETUS: CPT | Mod: 26

## 2017-08-28 PROCEDURE — 59025 FETAL NON-STRESS TEST: CPT

## 2017-08-28 RX ADMIN — HEPARIN SODIUM 5000 UNIT(S): 5000 INJECTION INTRAVENOUS; SUBCUTANEOUS at 05:29

## 2017-08-28 RX ADMIN — Medication 1 TABLET(S): at 13:36

## 2017-08-28 RX ADMIN — Medication 100 MILLIGRAM(S): at 05:29

## 2017-08-28 RX ADMIN — HEPARIN SODIUM 5000 UNIT(S): 5000 INJECTION INTRAVENOUS; SUBCUTANEOUS at 18:03

## 2017-08-29 PROCEDURE — 99231 SBSQ HOSP IP/OBS SF/LOW 25: CPT

## 2017-08-29 RX ADMIN — HEPARIN SODIUM 5000 UNIT(S): 5000 INJECTION INTRAVENOUS; SUBCUTANEOUS at 17:52

## 2017-08-29 RX ADMIN — Medication 1 TABLET(S): at 13:42

## 2017-08-29 RX ADMIN — HEPARIN SODIUM 5000 UNIT(S): 5000 INJECTION INTRAVENOUS; SUBCUTANEOUS at 06:07

## 2017-08-30 PROCEDURE — 99231 SBSQ HOSP IP/OBS SF/LOW 25: CPT

## 2017-08-30 RX ADMIN — Medication 100 MILLIGRAM(S): at 18:07

## 2017-08-30 RX ADMIN — Medication 1 TABLET(S): at 12:33

## 2017-08-30 RX ADMIN — HEPARIN SODIUM 5000 UNIT(S): 5000 INJECTION INTRAVENOUS; SUBCUTANEOUS at 18:07

## 2017-08-30 RX ADMIN — HEPARIN SODIUM 5000 UNIT(S): 5000 INJECTION INTRAVENOUS; SUBCUTANEOUS at 05:15

## 2017-08-31 PROCEDURE — 99231 SBSQ HOSP IP/OBS SF/LOW 25: CPT

## 2017-08-31 RX ADMIN — Medication 100 MILLIGRAM(S): at 19:07

## 2017-08-31 RX ADMIN — Medication 100 MILLIGRAM(S): at 06:39

## 2017-08-31 RX ADMIN — HEPARIN SODIUM 5000 UNIT(S): 5000 INJECTION INTRAVENOUS; SUBCUTANEOUS at 06:39

## 2017-08-31 RX ADMIN — Medication 1 TABLET(S): at 12:41

## 2017-08-31 RX ADMIN — HEPARIN SODIUM 5000 UNIT(S): 5000 INJECTION INTRAVENOUS; SUBCUTANEOUS at 19:07

## 2017-09-01 PROCEDURE — 99231 SBSQ HOSP IP/OBS SF/LOW 25: CPT

## 2017-09-01 RX ADMIN — HEPARIN SODIUM 5000 UNIT(S): 5000 INJECTION INTRAVENOUS; SUBCUTANEOUS at 05:25

## 2017-09-01 RX ADMIN — Medication 100 MILLIGRAM(S): at 05:25

## 2017-09-01 RX ADMIN — Medication 1 TABLET(S): at 12:37

## 2017-09-01 RX ADMIN — HEPARIN SODIUM 5000 UNIT(S): 5000 INJECTION INTRAVENOUS; SUBCUTANEOUS at 18:34

## 2017-09-01 RX ADMIN — Medication 100 MILLIGRAM(S): at 18:34

## 2017-09-01 NOTE — CHART NOTE - NSCHARTNOTEFT_GEN_A_CORE
Nutrition Interim Note    Pt seen for nutrition f/u. Pt with placenta previa at 30 2/7 weeks gestation. Allowed cafeteria options to  optimize food choices in setting of prolonged LOS. Pt pleased with  and denies meal complaints at this time, eating well with 100% intake often.    Pt made aware RD remains available as needed: Sultana Robert MS, RDN, CDN, CDE. #[phone] Nutrition Interim Note    Pt with placenta previa at 30 2/7 weeks gestation. Allowed cafeteria options to optimize food choices in setting of prolonged LOS.    Seen today for nutrition f/u. Pt Iranian speaking, used free  services  Etienne ID # [id number]. Pt states she's been using the regular menu instead of the cafeteria menu and therefore hasn't been able to eat what she wants. Requests copy of cafeteria menu. Otherwise she's been eating well with 100% intake often.    Copy of cafeteria menu provided to pt    Pt made aware RD remains available as needed: Sultana Robert MS, RDN, CDN, CDE. #[phone]

## 2017-09-02 PROCEDURE — 99231 SBSQ HOSP IP/OBS SF/LOW 25: CPT

## 2017-09-02 RX ADMIN — Medication 100 MILLIGRAM(S): at 17:37

## 2017-09-02 RX ADMIN — Medication 1 TABLET(S): at 12:40

## 2017-09-02 RX ADMIN — HEPARIN SODIUM 5000 UNIT(S): 5000 INJECTION INTRAVENOUS; SUBCUTANEOUS at 05:17

## 2017-09-02 RX ADMIN — Medication 100 MILLIGRAM(S): at 05:17

## 2017-09-02 RX ADMIN — HEPARIN SODIUM 5000 UNIT(S): 5000 INJECTION INTRAVENOUS; SUBCUTANEOUS at 17:37

## 2017-09-03 PROCEDURE — 99231 SBSQ HOSP IP/OBS SF/LOW 25: CPT

## 2017-09-03 RX ADMIN — HEPARIN SODIUM 5000 UNIT(S): 5000 INJECTION INTRAVENOUS; SUBCUTANEOUS at 05:50

## 2017-09-03 RX ADMIN — Medication 1 TABLET(S): at 12:43

## 2017-09-03 RX ADMIN — HEPARIN SODIUM 5000 UNIT(S): 5000 INJECTION INTRAVENOUS; SUBCUTANEOUS at 17:59

## 2017-09-03 RX ADMIN — Medication 100 MILLIGRAM(S): at 17:59

## 2017-09-03 RX ADMIN — Medication 100 MILLIGRAM(S): at 05:50

## 2017-09-04 LAB
BLD GP AB SCN SERPL QL: NEGATIVE — SIGNIFICANT CHANGE UP
HCT VFR BLD CALC: 33.4 % — LOW (ref 34.5–45)
HGB BLD-MCNC: 11.4 G/DL — LOW (ref 11.5–15.5)
MCHC RBC-ENTMCNC: 32.5 PG — SIGNIFICANT CHANGE UP (ref 27–34)
MCHC RBC-ENTMCNC: 34.1 GM/DL — SIGNIFICANT CHANGE UP (ref 32–36)
MCV RBC AUTO: 95.3 FL — SIGNIFICANT CHANGE UP (ref 80–100)
PLATELET # BLD AUTO: 155 K/UL — SIGNIFICANT CHANGE UP (ref 150–400)
RBC # BLD: 3.51 M/UL — LOW (ref 3.8–5.2)
RBC # FLD: 13.9 % — SIGNIFICANT CHANGE UP (ref 10.3–14.5)
RH IG SCN BLD-IMP: POSITIVE — SIGNIFICANT CHANGE UP
WBC # BLD: 8.6 K/UL — SIGNIFICANT CHANGE UP (ref 3.8–10.5)
WBC # FLD AUTO: 8.6 K/UL — SIGNIFICANT CHANGE UP (ref 3.8–10.5)

## 2017-09-04 PROCEDURE — 99232 SBSQ HOSP IP/OBS MODERATE 35: CPT

## 2017-09-04 RX ADMIN — Medication 1 TABLET(S): at 11:46

## 2017-09-04 RX ADMIN — Medication 100 MILLIGRAM(S): at 05:45

## 2017-09-04 RX ADMIN — HEPARIN SODIUM 5000 UNIT(S): 5000 INJECTION INTRAVENOUS; SUBCUTANEOUS at 05:45

## 2017-09-04 RX ADMIN — HEPARIN SODIUM 5000 UNIT(S): 5000 INJECTION INTRAVENOUS; SUBCUTANEOUS at 18:00

## 2017-09-04 RX ADMIN — Medication 100 MILLIGRAM(S): at 18:00

## 2017-09-05 DIAGNOSIS — O46.93 ANTEPARTUM HEMORRHAGE, UNSPECIFIED, THIRD TRIMESTER: ICD-10-CM

## 2017-09-05 DIAGNOSIS — O43.892 OTHER PLACENTAL DISORDERS, SECOND TRIMESTER: ICD-10-CM

## 2017-09-05 DIAGNOSIS — O44.03 COMPLETE PLACENTA PREVIA NOS OR WITHOUT HEMORRHAGE, THIRD TRIMESTER: ICD-10-CM

## 2017-09-05 DIAGNOSIS — Z01.818 ENCOUNTER FOR OTHER PREPROCEDURAL EXAMINATION: ICD-10-CM

## 2017-09-05 PROCEDURE — 99231 SBSQ HOSP IP/OBS SF/LOW 25: CPT

## 2017-09-05 RX ADMIN — Medication 1 TABLET(S): at 12:37

## 2017-09-05 RX ADMIN — HEPARIN SODIUM 5000 UNIT(S): 5000 INJECTION INTRAVENOUS; SUBCUTANEOUS at 06:01

## 2017-09-05 RX ADMIN — HEPARIN SODIUM 5000 UNIT(S): 5000 INJECTION INTRAVENOUS; SUBCUTANEOUS at 17:31

## 2017-09-06 ENCOUNTER — APPOINTMENT (OUTPATIENT)
Dept: ANTEPARTUM | Facility: CLINIC | Age: 22
End: 2017-09-06

## 2017-09-06 PROCEDURE — 99231 SBSQ HOSP IP/OBS SF/LOW 25: CPT

## 2017-09-06 RX ADMIN — Medication 1 TABLET(S): at 11:55

## 2017-09-06 RX ADMIN — HEPARIN SODIUM 5000 UNIT(S): 5000 INJECTION INTRAVENOUS; SUBCUTANEOUS at 18:31

## 2017-09-06 RX ADMIN — HEPARIN SODIUM 5000 UNIT(S): 5000 INJECTION INTRAVENOUS; SUBCUTANEOUS at 05:37

## 2017-09-06 RX ADMIN — Medication 100 MILLIGRAM(S): at 05:37

## 2017-09-06 NOTE — PROVIDER CONTACT NOTE (OTHER) - ASSESSMENT
no cramping
Patient denies any contractions, cramping, or vaginal fluid leaking.  Reports fetal movement. /73, P 76

## 2017-09-07 ENCOUNTER — APPOINTMENT (OUTPATIENT)
Dept: ANTEPARTUM | Facility: CLINIC | Age: 22
End: 2017-09-07

## 2017-09-07 PROCEDURE — 99231 SBSQ HOSP IP/OBS SF/LOW 25: CPT

## 2017-09-07 RX ORDER — ACETAMINOPHEN 500 MG
975 TABLET ORAL EVERY 6 HOURS
Qty: 0 | Refills: 0 | Status: DISCONTINUED | OUTPATIENT
Start: 2017-09-07 | End: 2017-09-22

## 2017-09-07 RX ADMIN — Medication 975 MILLIGRAM(S): at 22:11

## 2017-09-07 RX ADMIN — HEPARIN SODIUM 5000 UNIT(S): 5000 INJECTION INTRAVENOUS; SUBCUTANEOUS at 06:25

## 2017-09-07 RX ADMIN — Medication 100 MILLIGRAM(S): at 17:38

## 2017-09-07 RX ADMIN — HEPARIN SODIUM 5000 UNIT(S): 5000 INJECTION INTRAVENOUS; SUBCUTANEOUS at 17:38

## 2017-09-07 RX ADMIN — Medication 1 TABLET(S): at 11:24

## 2017-09-08 ENCOUNTER — ASOB RESULT (OUTPATIENT)
Age: 22
End: 2017-09-08

## 2017-09-08 ENCOUNTER — APPOINTMENT (OUTPATIENT)
Dept: ANTEPARTUM | Facility: CLINIC | Age: 22
End: 2017-09-08

## 2017-09-08 PROCEDURE — 99231 SBSQ HOSP IP/OBS SF/LOW 25: CPT

## 2017-09-08 RX ADMIN — Medication 100 MILLIGRAM(S): at 18:08

## 2017-09-08 RX ADMIN — HEPARIN SODIUM 5000 UNIT(S): 5000 INJECTION INTRAVENOUS; SUBCUTANEOUS at 06:12

## 2017-09-08 RX ADMIN — Medication 100 MILLIGRAM(S): at 06:12

## 2017-09-08 RX ADMIN — HEPARIN SODIUM 5000 UNIT(S): 5000 INJECTION INTRAVENOUS; SUBCUTANEOUS at 18:07

## 2017-09-08 RX ADMIN — Medication 1 TABLET(S): at 11:43

## 2017-09-09 PROCEDURE — 99231 SBSQ HOSP IP/OBS SF/LOW 25: CPT

## 2017-09-09 RX ADMIN — Medication 100 MILLIGRAM(S): at 17:24

## 2017-09-09 RX ADMIN — HEPARIN SODIUM 5000 UNIT(S): 5000 INJECTION INTRAVENOUS; SUBCUTANEOUS at 17:24

## 2017-09-09 RX ADMIN — Medication 1 TABLET(S): at 11:57

## 2017-09-09 RX ADMIN — Medication 100 MILLIGRAM(S): at 06:20

## 2017-09-09 RX ADMIN — HEPARIN SODIUM 5000 UNIT(S): 5000 INJECTION INTRAVENOUS; SUBCUTANEOUS at 06:20

## 2017-09-10 PROCEDURE — 99231 SBSQ HOSP IP/OBS SF/LOW 25: CPT

## 2017-09-10 RX ADMIN — HEPARIN SODIUM 5000 UNIT(S): 5000 INJECTION INTRAVENOUS; SUBCUTANEOUS at 17:46

## 2017-09-10 RX ADMIN — HEPARIN SODIUM 5000 UNIT(S): 5000 INJECTION INTRAVENOUS; SUBCUTANEOUS at 05:11

## 2017-09-10 RX ADMIN — Medication 1 TABLET(S): at 11:58

## 2017-09-10 RX ADMIN — Medication 100 MILLIGRAM(S): at 17:46

## 2017-09-10 RX ADMIN — Medication 100 MILLIGRAM(S): at 05:11

## 2017-09-11 PROCEDURE — 99231 SBSQ HOSP IP/OBS SF/LOW 25: CPT

## 2017-09-11 RX ADMIN — Medication 100 MILLIGRAM(S): at 05:37

## 2017-09-11 RX ADMIN — HEPARIN SODIUM 5000 UNIT(S): 5000 INJECTION INTRAVENOUS; SUBCUTANEOUS at 18:10

## 2017-09-11 RX ADMIN — Medication 1 TABLET(S): at 18:10

## 2017-09-11 RX ADMIN — Medication 975 MILLIGRAM(S): at 08:51

## 2017-09-11 RX ADMIN — HEPARIN SODIUM 5000 UNIT(S): 5000 INJECTION INTRAVENOUS; SUBCUTANEOUS at 05:38

## 2017-09-11 RX ADMIN — Medication 975 MILLIGRAM(S): at 08:52

## 2017-09-11 NOTE — CHART NOTE - NSCHARTNOTEFT_GEN_A_CORE
Pt at 31.5 weeks gestation seen for nutrition follow up. Pt reports very good appetite, denies GI distress. Tolerating regular diet and hospital cafeteria options made available to prevent menu fatigue.   Wt: no current weight available, current weight requested-RN aware  Meds include prenatal MVI   Labs: none pertinent to address at this time   skin intact/no edema noted.  Nutrition diagnosis of increased nutrient needs remains appropriate and addressed with nutrient dense meals and snacks.  1. Please weight Patient  2. Monitor wts/labs/intake.   3. Continue prenatal MVI daily.

## 2017-09-12 LAB
BASOPHILS # BLD AUTO: 0 K/UL — SIGNIFICANT CHANGE UP (ref 0–0.2)
BASOPHILS NFR BLD AUTO: 0.2 % — SIGNIFICANT CHANGE UP (ref 0–2)
BLD GP AB SCN SERPL QL: NEGATIVE — SIGNIFICANT CHANGE UP
EOSINOPHIL # BLD AUTO: 0.1 K/UL — SIGNIFICANT CHANGE UP (ref 0–0.5)
EOSINOPHIL NFR BLD AUTO: 0.6 % — SIGNIFICANT CHANGE UP (ref 0–6)
HCT VFR BLD CALC: 34.5 % — SIGNIFICANT CHANGE UP (ref 34.5–45)
HGB BLD-MCNC: 12 G/DL — SIGNIFICANT CHANGE UP (ref 11.5–15.5)
LYMPHOCYTES # BLD AUTO: 2.1 K/UL — SIGNIFICANT CHANGE UP (ref 1–3.3)
LYMPHOCYTES # BLD AUTO: 22.2 % — SIGNIFICANT CHANGE UP (ref 13–44)
MCHC RBC-ENTMCNC: 33 PG — SIGNIFICANT CHANGE UP (ref 27–34)
MCHC RBC-ENTMCNC: 34.7 GM/DL — SIGNIFICANT CHANGE UP (ref 32–36)
MCV RBC AUTO: 95.3 FL — SIGNIFICANT CHANGE UP (ref 80–100)
MONOCYTES # BLD AUTO: 0.7 K/UL — SIGNIFICANT CHANGE UP (ref 0–0.9)
MONOCYTES NFR BLD AUTO: 7.8 % — SIGNIFICANT CHANGE UP (ref 2–14)
NEUTROPHILS # BLD AUTO: 6.4 K/UL — SIGNIFICANT CHANGE UP (ref 1.8–7.4)
NEUTROPHILS NFR BLD AUTO: 69.1 % — SIGNIFICANT CHANGE UP (ref 43–77)
PLATELET # BLD AUTO: 150 K/UL — SIGNIFICANT CHANGE UP (ref 150–400)
RBC # BLD: 3.62 M/UL — LOW (ref 3.8–5.2)
RBC # FLD: 14.1 % — SIGNIFICANT CHANGE UP (ref 10.3–14.5)
RH IG SCN BLD-IMP: POSITIVE — SIGNIFICANT CHANGE UP
WBC # BLD: 9.2 K/UL — SIGNIFICANT CHANGE UP (ref 3.8–10.5)
WBC # FLD AUTO: 9.2 K/UL — SIGNIFICANT CHANGE UP (ref 3.8–10.5)

## 2017-09-12 PROCEDURE — 99231 SBSQ HOSP IP/OBS SF/LOW 25: CPT

## 2017-09-12 RX ADMIN — Medication 1 TABLET(S): at 12:13

## 2017-09-12 RX ADMIN — Medication 100 MILLIGRAM(S): at 06:34

## 2017-09-12 RX ADMIN — Medication 100 MILLIGRAM(S): at 17:41

## 2017-09-12 RX ADMIN — HEPARIN SODIUM 5000 UNIT(S): 5000 INJECTION INTRAVENOUS; SUBCUTANEOUS at 06:34

## 2017-09-12 RX ADMIN — HEPARIN SODIUM 5000 UNIT(S): 5000 INJECTION INTRAVENOUS; SUBCUTANEOUS at 17:41

## 2017-09-13 PROCEDURE — 99231 SBSQ HOSP IP/OBS SF/LOW 25: CPT

## 2017-09-13 RX ADMIN — HEPARIN SODIUM 5000 UNIT(S): 5000 INJECTION INTRAVENOUS; SUBCUTANEOUS at 06:41

## 2017-09-13 RX ADMIN — Medication 100 MILLIGRAM(S): at 06:41

## 2017-09-13 RX ADMIN — Medication 1 TABLET(S): at 12:33

## 2017-09-13 RX ADMIN — HEPARIN SODIUM 5000 UNIT(S): 5000 INJECTION INTRAVENOUS; SUBCUTANEOUS at 17:45

## 2017-09-13 RX ADMIN — Medication 100 MILLIGRAM(S): at 17:45

## 2017-09-14 PROCEDURE — 99231 SBSQ HOSP IP/OBS SF/LOW 25: CPT

## 2017-09-14 RX ADMIN — HEPARIN SODIUM 5000 UNIT(S): 5000 INJECTION INTRAVENOUS; SUBCUTANEOUS at 19:54

## 2017-09-14 RX ADMIN — Medication 1 TABLET(S): at 11:45

## 2017-09-14 RX ADMIN — Medication 100 MILLIGRAM(S): at 17:57

## 2017-09-14 RX ADMIN — Medication 100 MILLIGRAM(S): at 08:15

## 2017-09-14 RX ADMIN — HEPARIN SODIUM 5000 UNIT(S): 5000 INJECTION INTRAVENOUS; SUBCUTANEOUS at 08:15

## 2017-09-15 ENCOUNTER — APPOINTMENT (OUTPATIENT)
Dept: ANTEPARTUM | Facility: CLINIC | Age: 22
End: 2017-09-15

## 2017-09-15 ENCOUNTER — ASOB RESULT (OUTPATIENT)
Age: 22
End: 2017-09-15

## 2017-09-15 PROCEDURE — 99231 SBSQ HOSP IP/OBS SF/LOW 25: CPT

## 2017-09-15 PROCEDURE — 76819 FETAL BIOPHYS PROFIL W/O NST: CPT | Mod: 26

## 2017-09-15 PROCEDURE — 76816 OB US FOLLOW-UP PER FETUS: CPT | Mod: 26

## 2017-09-15 RX ADMIN — HEPARIN SODIUM 5000 UNIT(S): 5000 INJECTION INTRAVENOUS; SUBCUTANEOUS at 09:17

## 2017-09-15 RX ADMIN — HEPARIN SODIUM 5000 UNIT(S): 5000 INJECTION INTRAVENOUS; SUBCUTANEOUS at 21:34

## 2017-09-15 RX ADMIN — Medication 1 TABLET(S): at 12:03

## 2017-09-16 PROCEDURE — 99231 SBSQ HOSP IP/OBS SF/LOW 25: CPT | Mod: 25

## 2017-09-16 PROCEDURE — 59025 FETAL NON-STRESS TEST: CPT | Mod: 26

## 2017-09-16 RX ADMIN — HEPARIN SODIUM 5000 UNIT(S): 5000 INJECTION INTRAVENOUS; SUBCUTANEOUS at 22:09

## 2017-09-16 RX ADMIN — HEPARIN SODIUM 5000 UNIT(S): 5000 INJECTION INTRAVENOUS; SUBCUTANEOUS at 09:04

## 2017-09-16 RX ADMIN — Medication 1 TABLET(S): at 11:47

## 2017-09-16 RX ADMIN — Medication 100 MILLIGRAM(S): at 17:22

## 2017-09-16 RX ADMIN — Medication 100 MILLIGRAM(S): at 06:37

## 2017-09-17 PROCEDURE — 99231 SBSQ HOSP IP/OBS SF/LOW 25: CPT

## 2017-09-17 RX ADMIN — Medication 100 MILLIGRAM(S): at 05:24

## 2017-09-17 RX ADMIN — HEPARIN SODIUM 5000 UNIT(S): 5000 INJECTION INTRAVENOUS; SUBCUTANEOUS at 09:59

## 2017-09-17 RX ADMIN — Medication 1 TABLET(S): at 12:52

## 2017-09-17 RX ADMIN — HEPARIN SODIUM 5000 UNIT(S): 5000 INJECTION INTRAVENOUS; SUBCUTANEOUS at 22:05

## 2017-09-18 PROCEDURE — 99231 SBSQ HOSP IP/OBS SF/LOW 25: CPT

## 2017-09-18 RX ADMIN — Medication 100 MILLIGRAM(S): at 05:05

## 2017-09-18 RX ADMIN — HEPARIN SODIUM 5000 UNIT(S): 5000 INJECTION INTRAVENOUS; SUBCUTANEOUS at 08:57

## 2017-09-18 RX ADMIN — Medication 1 TABLET(S): at 12:07

## 2017-09-18 RX ADMIN — HEPARIN SODIUM 5000 UNIT(S): 5000 INJECTION INTRAVENOUS; SUBCUTANEOUS at 21:35

## 2017-09-19 ENCOUNTER — RESULT REVIEW (OUTPATIENT)
Age: 22
End: 2017-09-19

## 2017-09-19 LAB
APTT BLD: 26.7 SEC — LOW (ref 27.5–37.4)
FIBRINOGEN PPP-MCNC: 645 MG/DL — HIGH (ref 310–510)
HCT VFR BLD CALC: 36.1 % — SIGNIFICANT CHANGE UP (ref 34.5–45)
HGB BLD-MCNC: 12.3 G/DL — SIGNIFICANT CHANGE UP (ref 11.5–15.5)
INR BLD: 1.04 RATIO — SIGNIFICANT CHANGE UP (ref 0.88–1.16)
MCHC RBC-ENTMCNC: 32.7 PG — SIGNIFICANT CHANGE UP (ref 27–34)
MCHC RBC-ENTMCNC: 34 GM/DL — SIGNIFICANT CHANGE UP (ref 32–36)
MCV RBC AUTO: 96 FL — SIGNIFICANT CHANGE UP (ref 80–100)
PLATELET # BLD AUTO: 163 K/UL — SIGNIFICANT CHANGE UP (ref 150–400)
PROTHROM AB SERPL-ACNC: 11.4 SEC — SIGNIFICANT CHANGE UP (ref 9.8–12.7)
RBC # BLD: 3.76 M/UL — LOW (ref 3.8–5.2)
RBC # FLD: 14.1 % — SIGNIFICANT CHANGE UP (ref 10.3–14.5)
WBC # BLD: 9.2 K/UL — SIGNIFICANT CHANGE UP (ref 3.8–10.5)
WBC # FLD AUTO: 9.2 K/UL — SIGNIFICANT CHANGE UP (ref 3.8–10.5)

## 2017-09-19 PROCEDURE — 88307 TISSUE EXAM BY PATHOLOGIST: CPT | Mod: 26

## 2017-09-19 PROCEDURE — 59514 CESAREAN DELIVERY ONLY: CPT | Mod: U8

## 2017-09-19 RX ORDER — TETANUS TOXOID, REDUCED DIPHTHERIA TOXOID AND ACELLULAR PERTUSSIS VACCINE, ADSORBED 5; 2.5; 8; 8; 2.5 [IU]/.5ML; [IU]/.5ML; UG/.5ML; UG/.5ML; UG/.5ML
0.5 SUSPENSION INTRAMUSCULAR ONCE
Qty: 0 | Refills: 0 | Status: DISCONTINUED | OUTPATIENT
Start: 2017-09-19 | End: 2017-09-22

## 2017-09-19 RX ORDER — DOCUSATE SODIUM 100 MG
100 CAPSULE ORAL
Qty: 0 | Refills: 0 | Status: DISCONTINUED | OUTPATIENT
Start: 2017-09-19 | End: 2017-09-22

## 2017-09-19 RX ORDER — OXYTOCIN 10 UNIT/ML
333.33 VIAL (ML) INJECTION
Qty: 20 | Refills: 0 | Status: DISCONTINUED | OUTPATIENT
Start: 2017-09-19 | End: 2017-09-22

## 2017-09-19 RX ORDER — KETOROLAC TROMETHAMINE 30 MG/ML
30 SYRINGE (ML) INJECTION EVERY 6 HOURS
Qty: 0 | Refills: 0 | Status: DISCONTINUED | OUTPATIENT
Start: 2017-09-19 | End: 2017-09-20

## 2017-09-19 RX ORDER — NALOXONE HYDROCHLORIDE 4 MG/.1ML
0.1 SPRAY NASAL
Qty: 0 | Refills: 0 | Status: DISCONTINUED | OUTPATIENT
Start: 2017-09-19 | End: 2017-09-21

## 2017-09-19 RX ORDER — FERROUS SULFATE 325(65) MG
325 TABLET ORAL DAILY
Qty: 0 | Refills: 0 | Status: DISCONTINUED | OUTPATIENT
Start: 2017-09-19 | End: 2017-09-22

## 2017-09-19 RX ORDER — GLYCERIN ADULT
1 SUPPOSITORY, RECTAL RECTAL AT BEDTIME
Qty: 0 | Refills: 0 | Status: DISCONTINUED | OUTPATIENT
Start: 2017-09-19 | End: 2017-09-22

## 2017-09-19 RX ORDER — IBUPROFEN 200 MG
600 TABLET ORAL EVERY 6 HOURS
Qty: 0 | Refills: 0 | Status: DISCONTINUED | OUTPATIENT
Start: 2017-09-19 | End: 2017-09-22

## 2017-09-19 RX ORDER — OXYTOCIN 10 UNIT/ML
41.67 VIAL (ML) INJECTION
Qty: 20 | Refills: 0 | Status: DISCONTINUED | OUTPATIENT
Start: 2017-09-19 | End: 2017-09-22

## 2017-09-19 RX ORDER — ONDANSETRON 8 MG/1
4 TABLET, FILM COATED ORAL EVERY 6 HOURS
Qty: 0 | Refills: 0 | Status: DISCONTINUED | OUTPATIENT
Start: 2017-09-19 | End: 2017-09-21

## 2017-09-19 RX ORDER — ACETAMINOPHEN 500 MG
975 TABLET ORAL EVERY 6 HOURS
Qty: 0 | Refills: 0 | Status: DISCONTINUED | OUTPATIENT
Start: 2017-09-19 | End: 2017-09-22

## 2017-09-19 RX ORDER — OXYTOCIN 10 UNIT/ML
41.67 VIAL (ML) INJECTION
Qty: 20 | Refills: 0 | Status: DISCONTINUED | OUTPATIENT
Start: 2017-09-19 | End: 2017-09-19

## 2017-09-19 RX ORDER — DEXAMETHASONE 0.5 MG/5ML
4 ELIXIR ORAL EVERY 6 HOURS
Qty: 0 | Refills: 0 | Status: DISCONTINUED | OUTPATIENT
Start: 2017-09-19 | End: 2017-09-21

## 2017-09-19 RX ORDER — SIMETHICONE 80 MG/1
80 TABLET, CHEWABLE ORAL EVERY 4 HOURS
Qty: 0 | Refills: 0 | Status: DISCONTINUED | OUTPATIENT
Start: 2017-09-19 | End: 2017-09-22

## 2017-09-19 RX ORDER — OXYTOCIN 10 UNIT/ML
333.33 VIAL (ML) INJECTION
Qty: 20 | Refills: 0 | Status: DISCONTINUED | OUTPATIENT
Start: 2017-09-19 | End: 2017-09-19

## 2017-09-19 RX ORDER — SODIUM CHLORIDE 9 MG/ML
1000 INJECTION, SOLUTION INTRAVENOUS
Qty: 0 | Refills: 0 | Status: DISCONTINUED | OUTPATIENT
Start: 2017-09-19 | End: 2017-09-22

## 2017-09-19 RX ORDER — LANOLIN
1 OINTMENT (GRAM) TOPICAL
Qty: 0 | Refills: 0 | Status: DISCONTINUED | OUTPATIENT
Start: 2017-09-19 | End: 2017-09-22

## 2017-09-19 RX ORDER — DIPHENHYDRAMINE HCL 50 MG
25 CAPSULE ORAL EVERY 6 HOURS
Qty: 0 | Refills: 0 | Status: DISCONTINUED | OUTPATIENT
Start: 2017-09-19 | End: 2017-09-22

## 2017-09-19 RX ORDER — OXYCODONE HYDROCHLORIDE 5 MG/1
5 TABLET ORAL
Qty: 0 | Refills: 0 | Status: DISCONTINUED | OUTPATIENT
Start: 2017-09-19 | End: 2017-09-22

## 2017-09-19 RX ORDER — SODIUM CHLORIDE 9 MG/ML
1000 INJECTION, SOLUTION INTRAVENOUS
Qty: 0 | Refills: 0 | Status: DISCONTINUED | OUTPATIENT
Start: 2017-09-19 | End: 2017-09-19

## 2017-09-19 RX ORDER — OXYCODONE HYDROCHLORIDE 5 MG/1
5 TABLET ORAL EVERY 4 HOURS
Qty: 0 | Refills: 0 | Status: DISCONTINUED | OUTPATIENT
Start: 2017-09-19 | End: 2017-09-22

## 2017-09-19 RX ADMIN — Medication 325 MILLIGRAM(S): at 22:23

## 2017-09-19 RX ADMIN — Medication 30 MILLIGRAM(S): at 23:00

## 2017-09-19 RX ADMIN — Medication 100 MILLIGRAM(S): at 06:13

## 2017-09-19 RX ADMIN — Medication 125 MILLIUNIT(S)/MIN: at 16:00

## 2017-09-19 RX ADMIN — Medication 30 MILLIGRAM(S): at 22:24

## 2017-09-19 RX ADMIN — Medication 975 MILLIGRAM(S): at 22:23

## 2017-09-19 RX ADMIN — Medication 12 MILLIGRAM(S): at 07:04

## 2017-09-19 RX ADMIN — Medication 100 MILLIGRAM(S): at 22:23

## 2017-09-19 RX ADMIN — SIMETHICONE 80 MILLIGRAM(S): 80 TABLET, CHEWABLE ORAL at 22:23

## 2017-09-19 RX ADMIN — HEPARIN SODIUM 5000 UNIT(S): 5000 INJECTION INTRAVENOUS; SUBCUTANEOUS at 22:23

## 2017-09-19 RX ADMIN — Medication 975 MILLIGRAM(S): at 23:00

## 2017-09-19 RX ADMIN — SODIUM CHLORIDE 125 MILLILITER(S): 9 INJECTION, SOLUTION INTRAVENOUS at 20:12

## 2017-09-19 NOTE — CHART NOTE - NSCHARTNOTEFT_GEN_A_CORE
R3 OB Postop Check:    S: Patient seen in PACU. No acute events. Pt not yet ambulating, voiding or tolerating PO. Pain well controlled. Denies fever, chills, n/v, abdominal pain or chest pain. .    O: Vital Signs Last 24 Hrs  T(C): 36.4 (19 Sep 2017 15:15), Max: 36.8 (18 Sep 2017 21:51)  T(F): 97.5 (19 Sep 2017 15:15), Max: 98.2 (18 Sep 2017 21:51)  HR: 88 (19 Sep 2017 18:30) (74 - 108)  BP: 102/53 (19 Sep 2017 18:30) (98/56 - 113/56)  BP(mean): 75 (19 Sep 2017 18:30) (70 - 80)  RR: 17 (19 Sep 2017 18:30) (14 - 18)  SpO2: 96% (19 Sep 2017 18:30) (95% - 100%)    Gen: NAD  Abd: soft, NT, ND, fundus firm below umbilicus  Lochia: moderate  Ext: no tenderness    Labs:                        12.3   9.2   )-----------( 163      ( 19 Sep 2017 06:58 )             36.1       A: 23 yo s/p Mid-Transverse c/s for placenta previa @32w6d. POD#0  -Pt currently stable, afebrile  -Regular diet  -SCDs for DVT ppx  -Continue to monitor in PACU  Dispo: Send to postpartum floor when pt able to walk      MAXIMINO Lam, PGY3

## 2017-09-20 ENCOUNTER — TRANSCRIPTION ENCOUNTER (OUTPATIENT)
Age: 22
End: 2017-09-20

## 2017-09-20 LAB
HCT VFR BLD CALC: 30.2 % — LOW (ref 34.5–45)
HGB BLD-MCNC: 10.6 G/DL — LOW (ref 11.5–15.5)
MCHC RBC-ENTMCNC: 33.5 PG — SIGNIFICANT CHANGE UP (ref 27–34)
MCHC RBC-ENTMCNC: 34.9 GM/DL — SIGNIFICANT CHANGE UP (ref 32–36)
MCV RBC AUTO: 96.1 FL — SIGNIFICANT CHANGE UP (ref 80–100)
PLATELET # BLD AUTO: 149 K/UL — LOW (ref 150–400)
RBC # BLD: 3.15 M/UL — LOW (ref 3.8–5.2)
RBC # FLD: 14.1 % — SIGNIFICANT CHANGE UP (ref 10.3–14.5)
WBC # BLD: 16.7 K/UL — HIGH (ref 3.8–10.5)
WBC # FLD AUTO: 16.7 K/UL — HIGH (ref 3.8–10.5)

## 2017-09-20 RX ADMIN — Medication 975 MILLIGRAM(S): at 23:15

## 2017-09-20 RX ADMIN — Medication 325 MILLIGRAM(S): at 11:45

## 2017-09-20 RX ADMIN — Medication 30 MILLIGRAM(S): at 16:50

## 2017-09-20 RX ADMIN — Medication 30 MILLIGRAM(S): at 05:15

## 2017-09-20 RX ADMIN — HEPARIN SODIUM 5000 UNIT(S): 5000 INJECTION INTRAVENOUS; SUBCUTANEOUS at 22:24

## 2017-09-20 RX ADMIN — Medication 1 TABLET(S): at 11:45

## 2017-09-20 RX ADMIN — SODIUM CHLORIDE 125 MILLILITER(S): 9 INJECTION, SOLUTION INTRAVENOUS at 19:35

## 2017-09-20 RX ADMIN — Medication 30 MILLIGRAM(S): at 10:45

## 2017-09-20 RX ADMIN — Medication 30 MILLIGRAM(S): at 04:43

## 2017-09-20 RX ADMIN — HEPARIN SODIUM 5000 UNIT(S): 5000 INJECTION INTRAVENOUS; SUBCUTANEOUS at 10:20

## 2017-09-20 RX ADMIN — Medication 30 MILLIGRAM(S): at 10:19

## 2017-09-20 RX ADMIN — Medication 975 MILLIGRAM(S): at 17:15

## 2017-09-20 RX ADMIN — Medication 975 MILLIGRAM(S): at 10:20

## 2017-09-20 RX ADMIN — Medication 600 MILLIGRAM(S): at 23:16

## 2017-09-20 RX ADMIN — SODIUM CHLORIDE 125 MILLILITER(S): 9 INJECTION, SOLUTION INTRAVENOUS at 11:44

## 2017-09-20 RX ADMIN — Medication 30 MILLIGRAM(S): at 17:15

## 2017-09-20 RX ADMIN — Medication 975 MILLIGRAM(S): at 16:51

## 2017-09-20 RX ADMIN — Medication 975 MILLIGRAM(S): at 22:24

## 2017-09-20 RX ADMIN — Medication 975 MILLIGRAM(S): at 10:45

## 2017-09-20 RX ADMIN — Medication 600 MILLIGRAM(S): at 22:24

## 2017-09-20 RX ADMIN — Medication 975 MILLIGRAM(S): at 04:43

## 2017-09-20 RX ADMIN — Medication 975 MILLIGRAM(S): at 05:15

## 2017-09-20 NOTE — LACTATION INITIAL EVALUATION - LACTATION INTERVENTIONS
initiate skin to skin/initiate hand expression routine/initiate dual electric pump routine/PI#[id number] Icelandic

## 2017-09-20 NOTE — PROGRESS NOTE ADULT - SUBJECTIVE AND OBJECTIVE BOX
Day 1 of Anesthesia Pain Management Service    SUBJECTIVE: I'm okay  Pain Scale Score:    [X] Refer to charted pain scores    THERAPY: Epidural Bupivacaine 0.01 % and Fentanyl 3 micrograms/mL     Demand Dose: 3 mL  Lockout: 15 minutes   Continuous Rate:  10 mL    MEDICATIONS  (STANDING):  prenatal multivitamin 1 Tablet(s) Oral daily  heparin  Injectable 5000 Unit(s) SubCutaneous every 12 hours  docusate sodium 100 milliGRAM(s) Oral two times a day  oxytocin Infusion 333.333 milliUNIT(s)/Min (1000 mL/Hr) IV Continuous <Continuous>  oxytocin Infusion 41.667 milliUNIT(s)/Min (125 mL/Hr) IV Continuous <Continuous>  fentaNYL (3 MICROgram(s)/mL) + BUpivacaine 0.01% in 0.9% Sodium Chloride PCEA 250 milliLiter(s) Epidural PCA Continuous  lactated ringers. 1000 milliLiter(s) (125 mL/Hr) IV Continuous <Continuous>  diphtheria/tetanus/pertussis (acellular) Vaccine (ADAcel) 0.5 milliLiter(s) IntraMuscular once  oxytocin Infusion 41.667 milliUNIT(s)/Min (125 mL/Hr) IV Continuous <Continuous>  ferrous    sulfate 325 milliGRAM(s) Oral daily  oxyCODONE    IR 5 milliGRAM(s) Oral every 3 hours  acetaminophen   Tablet. 975 milliGRAM(s) Oral every 6 hours  ibuprofen  Tablet 600 milliGRAM(s) Oral every 6 hours  ketorolac   Injectable 30 milliGRAM(s) IV Push every 6 hours    MEDICATIONS  (PRN):  acetaminophen   Tablet. 975 milliGRAM(s) Oral every 6 hours PRN Moderate Pain (4 - 6)  fentaNYL (3 MICROgram(s)/mL) + BUpivacaine 0.01% in 0.9% Sodium Chloride PCEA Rescue Clinician Bolus 5 milliLiter(s) Epidural every 15 minutes PRN Moderate Pain (4 - 6)  naloxone Injectable 0.1 milliGRAM(s) IV Push every 3 minutes PRN For ANY of the following changes in patient status:  A. RR LESS THAN 10 breaths per minute, B. Oxygen saturation LESS THAN 90%, C. Sedation score of 6  ondansetron Injectable 4 milliGRAM(s) IV Push every 6 hours PRN Nausea  dexamethasone  Injectable 4 milliGRAM(s) IV Push every 6 hours PRN Nausea, IF ondansetron is ineffective after 30 - 60 minutes  simethicone 80 milliGRAM(s) Chew every 4 hours PRN Gas  diphenhydrAMINE   Capsule 25 milliGRAM(s) Oral every 6 hours PRN Itching  glycerin Suppository - Adult 1 Suppository(s) Rectal at bedtime PRN Constipation  docusate sodium 100 milliGRAM(s) Oral two times a day PRN Stool Softening  lanolin Ointment 1 Application(s) Topical every 3 hours PRN Sore Nipples  oxyCODONE    IR 5 milliGRAM(s) Oral every 4 hours PRN Severe Pain (7 - 10)      OBJECTIVE:    Assessment of Epidural Catheter Site: 	    [X] Dressing intact	[X] Site non-tender	[X] Site without erythema, discharge, edema  [X] Epidural tubing and connection checked	[X] Gross neurological exam within normal limits  [ ] Catheter removed – tip intact		    PT/INR - ( 19 Sep 2017 06:58 )   PT: 11.4 sec;   INR: 1.04 ratio         PTT - ( 19 Sep 2017 06:58 )  PTT:26.7 sec                      10.6   16.7  )-----------( 149      ( 20 Sep 2017 07:02 )             30.2     Vital Signs Last 24 Hrs  T(C): 36.7 (09-20-17 @ 05:00), Max: 36.9 (09-19-17 @ 20:45)  T(F): 98.1 (09-20-17 @ 05:00), Max: 98.4 (09-19-17 @ 20:45)  HR: 77 (09-20-17 @ 05:00) (68 - 91)  BP: 96/58 (09-20-17 @ 05:00) (95/52 - 113/56)  BP(mean): 74 (09-19-17 @ 20:00) (70 - 80)  RR: 18 (09-20-17 @ 05:00) (14 - 18)  SpO2: 98% (09-20-17 @ 05:00) (95% - 100%)      Sedation Score:	[X] Alert	[ ] Drowsy	[ ] Arousable  [ ] Asleep  [ ] Unresponsive    Side Effects:	[X] None	[ ] Nausea	[ ] Vomiting  [ ] Pruritus  		[ ] Weakness  [ ] Numbness  [ ] Other:    ASSESSMENT/ PLAN:    Therapy:                         [X] Continue   [ ] Discontinue   [ ] Change to PRN Analgesics    Documentation and Verification of current medications:  [X] Done	[ ] Not done, not eligible, reason:    Comments:  phone utilized

## 2017-09-20 NOTE — PROGRESS NOTE ADULT - SUBJECTIVE AND OBJECTIVE BOX
Patient seen and examined at bedside, no acute overnight events. No acute complaints, pain well controlled. Patient is ambulating and tolerating regular diet. Has not passed flatus yet. Faustin was still in place this AM. Patient would like Depo shot on discharge.     Vital Signs Last 24 Hours  T(C): 36.9 (09-20-17 @ 10:00), Max: 36.9 (09-19-17 @ 20:45)  HR: 99 (09-20-17 @ 10:00) (68 - 99)  BP: 107/66 (09-20-17 @ 10:00) (95/52 - 113/56)  RR: 18 (09-20-17 @ 10:00) (14 - 18)  SpO2: 98% (09-20-17 @ 10:00) (95% - 100%)    I&O's Summary    19 Sep 2017 07:01  -  20 Sep 2017 07:00  --------------------------------------------------------  IN: 1625 mL / OUT: 4675 mL / NET: -3050 mL        Physical Exam:  General: NAD  Abdomen: Soft, non-tender, non-distended, fundus firm  Incision: Pfannenstiel incision CDI, subcuticular suture closure. Clear, dry and intact. Dermabond in place.  Pelvic: Lochia wnl  Ext: non tender    Labs:    Blood Type: A Positive  Antibody Screen: Negative               10.6   16.7  )-----------( 149      ( 09-20 @ 07:02 )             30.2                12.3   9.2   )-----------( 163      ( 09-19 @ 06:58 )             36.1                12.0   9.2   )-----------( 150      ( 09-12 @ 10:33 )             34.5         MEDICATIONS  (STANDING):  prenatal multivitamin 1 Tablet(s) Oral daily  heparin  Injectable 5000 Unit(s) SubCutaneous every 12 hours  docusate sodium 100 milliGRAM(s) Oral two times a day  oxytocin Infusion 333.333 milliUNIT(s)/Min (1000 mL/Hr) IV Continuous <Continuous>  oxytocin Infusion 41.667 milliUNIT(s)/Min (125 mL/Hr) IV Continuous <Continuous>  fentaNYL (3 MICROgram(s)/mL) + BUpivacaine 0.01% in 0.9% Sodium Chloride PCEA 250 milliLiter(s) Epidural PCA Continuous  lactated ringers. 1000 milliLiter(s) (125 mL/Hr) IV Continuous <Continuous>  diphtheria/tetanus/pertussis (acellular) Vaccine (ADAcel) 0.5 milliLiter(s) IntraMuscular once  oxytocin Infusion 41.667 milliUNIT(s)/Min (125 mL/Hr) IV Continuous <Continuous>  ferrous    sulfate 325 milliGRAM(s) Oral daily  oxyCODONE    IR 5 milliGRAM(s) Oral every 3 hours  acetaminophen   Tablet. 975 milliGRAM(s) Oral every 6 hours  ibuprofen  Tablet 600 milliGRAM(s) Oral every 6 hours  ketorolac   Injectable 30 milliGRAM(s) IV Push every 6 hours    MEDICATIONS  (PRN):  acetaminophen   Tablet. 975 milliGRAM(s) Oral every 6 hours PRN Moderate Pain (4 - 6)  fentaNYL (3 MICROgram(s)/mL) + BUpivacaine 0.01% in 0.9% Sodium Chloride PCEA Rescue Clinician Bolus 5 milliLiter(s) Epidural every 15 minutes PRN Moderate Pain (4 - 6)  naloxone Injectable 0.1 milliGRAM(s) IV Push every 3 minutes PRN For ANY of the following changes in patient status:  A. RR LESS THAN 10 breaths per minute, B. Oxygen saturation LESS THAN 90%, C. Sedation score of 6  ondansetron Injectable 4 milliGRAM(s) IV Push every 6 hours PRN Nausea  dexamethasone  Injectable 4 milliGRAM(s) IV Push every 6 hours PRN Nausea, IF ondansetron is ineffective after 30 - 60 minutes  simethicone 80 milliGRAM(s) Chew every 4 hours PRN Gas  diphenhydrAMINE   Capsule 25 milliGRAM(s) Oral every 6 hours PRN Itching  glycerin Suppository - Adult 1 Suppository(s) Rectal at bedtime PRN Constipation  docusate sodium 100 milliGRAM(s) Oral two times a day PRN Stool Softening  lanolin Ointment 1 Application(s) Topical every 3 hours PRN Sore Nipples  oxyCODONE    IR 5 milliGRAM(s) Oral every 4 hours PRN Severe Pain (7 - 10)

## 2017-09-21 LAB — MEV IGM SER-ACNC: <20 AU/ML — SIGNIFICANT CHANGE UP (ref 0–19.9)

## 2017-09-21 RX ADMIN — OXYCODONE HYDROCHLORIDE 5 MILLIGRAM(S): 5 TABLET ORAL at 21:14

## 2017-09-21 RX ADMIN — Medication 600 MILLIGRAM(S): at 17:23

## 2017-09-21 RX ADMIN — Medication 600 MILLIGRAM(S): at 23:25

## 2017-09-21 RX ADMIN — HEPARIN SODIUM 5000 UNIT(S): 5000 INJECTION INTRAVENOUS; SUBCUTANEOUS at 10:13

## 2017-09-21 RX ADMIN — Medication 1 TABLET(S): at 12:00

## 2017-09-21 RX ADMIN — Medication 975 MILLIGRAM(S): at 18:26

## 2017-09-21 RX ADMIN — Medication 975 MILLIGRAM(S): at 06:08

## 2017-09-21 RX ADMIN — Medication 975 MILLIGRAM(S): at 12:00

## 2017-09-21 RX ADMIN — Medication 600 MILLIGRAM(S): at 12:30

## 2017-09-21 RX ADMIN — OXYCODONE HYDROCHLORIDE 5 MILLIGRAM(S): 5 TABLET ORAL at 09:00

## 2017-09-21 RX ADMIN — Medication 600 MILLIGRAM(S): at 06:08

## 2017-09-21 RX ADMIN — Medication 100 MILLIGRAM(S): at 06:09

## 2017-09-21 RX ADMIN — Medication 975 MILLIGRAM(S): at 17:23

## 2017-09-21 RX ADMIN — Medication 600 MILLIGRAM(S): at 18:26

## 2017-09-21 RX ADMIN — Medication 100 MILLIGRAM(S): at 17:23

## 2017-09-21 RX ADMIN — Medication 600 MILLIGRAM(S): at 06:45

## 2017-09-21 RX ADMIN — Medication 975 MILLIGRAM(S): at 23:24

## 2017-09-21 RX ADMIN — OXYCODONE HYDROCHLORIDE 5 MILLIGRAM(S): 5 TABLET ORAL at 12:30

## 2017-09-21 RX ADMIN — Medication 600 MILLIGRAM(S): at 12:00

## 2017-09-21 RX ADMIN — OXYCODONE HYDROCHLORIDE 5 MILLIGRAM(S): 5 TABLET ORAL at 08:21

## 2017-09-21 RX ADMIN — OXYCODONE HYDROCHLORIDE 5 MILLIGRAM(S): 5 TABLET ORAL at 17:23

## 2017-09-21 RX ADMIN — Medication 975 MILLIGRAM(S): at 06:45

## 2017-09-21 RX ADMIN — OXYCODONE HYDROCHLORIDE 5 MILLIGRAM(S): 5 TABLET ORAL at 20:44

## 2017-09-21 RX ADMIN — OXYCODONE HYDROCHLORIDE 5 MILLIGRAM(S): 5 TABLET ORAL at 23:25

## 2017-09-21 RX ADMIN — HEPARIN SODIUM 5000 UNIT(S): 5000 INJECTION INTRAVENOUS; SUBCUTANEOUS at 23:25

## 2017-09-21 RX ADMIN — Medication 100 MILLIGRAM(S): at 12:00

## 2017-09-21 RX ADMIN — Medication 325 MILLIGRAM(S): at 12:00

## 2017-09-21 RX ADMIN — OXYCODONE HYDROCHLORIDE 5 MILLIGRAM(S): 5 TABLET ORAL at 18:26

## 2017-09-21 RX ADMIN — OXYCODONE HYDROCHLORIDE 5 MILLIGRAM(S): 5 TABLET ORAL at 12:00

## 2017-09-21 RX ADMIN — Medication 975 MILLIGRAM(S): at 12:30

## 2017-09-21 NOTE — PROGRESS NOTE ADULT - SUBJECTIVE AND OBJECTIVE BOX
Day 2 of Anesthesia Pain Management Service    SUBJECTIVE: I'm okay  Pain Scale Score:    [X] Refer to charted pain scores    THERAPY: Epidural Bupivacaine 0.01 % and Fentanyl 3 micrograms/mL     Demand Dose: 3 mL  Lockout: 15 minutes   Continuous Rate:  10 mL    MEDICATIONS  (STANDING):  prenatal multivitamin 1 Tablet(s) Oral daily  heparin  Injectable 5000 Unit(s) SubCutaneous every 12 hours  docusate sodium 100 milliGRAM(s) Oral two times a day  oxytocin Infusion 333.333 milliUNIT(s)/Min (1000 mL/Hr) IV Continuous <Continuous>  oxytocin Infusion 41.667 milliUNIT(s)/Min (125 mL/Hr) IV Continuous <Continuous>  fentaNYL (3 MICROgram(s)/mL) + BUpivacaine 0.01% in 0.9% Sodium Chloride PCEA 250 milliLiter(s) Epidural PCA Continuous  lactated ringers. 1000 milliLiter(s) (125 mL/Hr) IV Continuous <Continuous>  diphtheria/tetanus/pertussis (acellular) Vaccine (ADAcel) 0.5 milliLiter(s) IntraMuscular once  oxytocin Infusion 41.667 milliUNIT(s)/Min (125 mL/Hr) IV Continuous <Continuous>  ferrous    sulfate 325 milliGRAM(s) Oral daily  oxyCODONE    IR 5 milliGRAM(s) Oral every 3 hours  acetaminophen   Tablet. 975 milliGRAM(s) Oral every 6 hours  ibuprofen  Tablet 600 milliGRAM(s) Oral every 6 hours    MEDICATIONS  (PRN):  acetaminophen   Tablet. 975 milliGRAM(s) Oral every 6 hours PRN Moderate Pain (4 - 6)  fentaNYL (3 MICROgram(s)/mL) + BUpivacaine 0.01% in 0.9% Sodium Chloride PCEA Rescue Clinician Bolus 5 milliLiter(s) Epidural every 15 minutes PRN Moderate Pain (4 - 6)  naloxone Injectable 0.1 milliGRAM(s) IV Push every 3 minutes PRN For ANY of the following changes in patient status:  A. RR LESS THAN 10 breaths per minute, B. Oxygen saturation LESS THAN 90%, C. Sedation score of 6  ondansetron Injectable 4 milliGRAM(s) IV Push every 6 hours PRN Nausea  dexamethasone  Injectable 4 milliGRAM(s) IV Push every 6 hours PRN Nausea, IF ondansetron is ineffective after 30 - 60 minutes  simethicone 80 milliGRAM(s) Chew every 4 hours PRN Gas  diphenhydrAMINE   Capsule 25 milliGRAM(s) Oral every 6 hours PRN Itching  glycerin Suppository - Adult 1 Suppository(s) Rectal at bedtime PRN Constipation  docusate sodium 100 milliGRAM(s) Oral two times a day PRN Stool Softening  lanolin Ointment 1 Application(s) Topical every 3 hours PRN Sore Nipples  oxyCODONE    IR 5 milliGRAM(s) Oral every 4 hours PRN Severe Pain (7 - 10)      OBJECTIVE:    Assessment of Epidural Catheter Site: 	    [ ] Dressing intact	[X] Site non-tender	[X] Site without erythema, discharge, edema  [ ] Epidural tubing and connection checked	[X] Gross neurological exam within normal limits  [X] Catheter removed                          10.6   16.7  )-----------( 149      ( 20 Sep 2017 07:02 )             30.2     Vital Signs Last 24 Hrs  T(C): 36.8 (09-21-17 @ 09:06), Max: 37.2 (09-20-17 @ 14:06)  T(F): 98.2 (09-21-17 @ 09:06), Max: 99 (09-20-17 @ 14:06)  HR: 99 (09-21-17 @ 09:06) (65 - 99)  BP: 100/68 (09-21-17 @ 09:06) (97/60 - 100/68)  BP(mean): --  RR: 18 (09-21-17 @ 09:06) (18 - 18)  SpO2: 100% (09-21-17 @ 09:06) (97% - 100%)      Sedation Score:	[X] Alert	[ ] Drowsy	[ ] Arousable  [ ] Asleep  [ ] Unresponsive    Side Effects:	[X] None	[ ] Nausea	[ ] Vomiting  [ ] Pruritus  		[ ] Weakness  [ ] Numbness  [ ] Other:    ASSESSMENT/ PLAN:    Therapy:                         [ ] Continue   [X] Discontinue   [X] Change to PRN Analgesics    Documentation and Verification of current medications:  [X] Done	[ ] Not done, not eligible, reason:    Comments:

## 2017-09-21 NOTE — PROGRESS NOTE ADULT - PROBLEM SELECTOR PLAN 1
- Continue PO analgesia.   - Continue regular diet.  - Increase ambulation.  - Plan for Depo for birth control.

## 2017-09-21 NOTE — PROGRESS NOTE ADULT - SUBJECTIVE AND OBJECTIVE BOX
Pain Management Attending Addendum    SUBJECTIVE: ok    Therapy:	  [ ] IV PCA	   [x ] Epidural           [ ] s/p Spinal Opoid              [ ] Postpartum infusion	  [ ] Patient controlled regional anesthesia (PCRA)    [ ] prn Analgesics    OBJECTIVE:   [ x] No new signs     [ ] Other:    Side Effects:  [ x] None			[ ] Other:    Assessment of Catheter Site:		[x ] Intact		[ ] Other:    ASSESSMENT/PLAN  [ ] Continue current therapy    [x ] Therapy changed to:    [ ] IV PCA       [ ] Epidural     [x ] prn Analgesics     [ ] post partum infusion    Comments:

## 2017-09-21 NOTE — PROGRESS NOTE ADULT - SUBJECTIVE AND OBJECTIVE BOX
OB Progress Note: TLCS, POD#2    S: 21yo  POD#2 s/p pLTCS at 35wk for placenta previa. Pain is well controlled. She is tolerating a regular diet and passing flatus. She is voiding spontaneously, and ambulating without difficulty. Denies CP/SOB. Denies lightheadedness/dizziness. Denies N/V.    O:  Vitals:  Vital Signs Last 24 Hrs  T(C): 36.5 (21 Sep 2017 05:00), Max: 37.2 (20 Sep 2017 14:06)  T(F): 97.7 (21 Sep 2017 05:00), Max: 99 (20 Sep 2017 14:06)  HR: 71 (21 Sep 2017 05:00) (65 - 99)  BP: 98/62 (21 Sep 2017 05:00) (97/60 - 107/66)  BP(mean): --  RR: 18 (21 Sep 2017 05:00) (18 - 18)  SpO2: 99% (21 Sep 2017 05:00) (97% - 99%)    MEDICATIONS  (STANDING):  prenatal multivitamin 1 Tablet(s) Oral daily  heparin  Injectable 5000 Unit(s) SubCutaneous every 12 hours  docusate sodium 100 milliGRAM(s) Oral two times a day  oxytocin Infusion 333.333 milliUNIT(s)/Min (1000 mL/Hr) IV Continuous <Continuous>  oxytocin Infusion 41.667 milliUNIT(s)/Min (125 mL/Hr) IV Continuous <Continuous>  fentaNYL (3 MICROgram(s)/mL) + BUpivacaine 0.01% in 0.9% Sodium Chloride PCEA 250 milliLiter(s) Epidural PCA Continuous  lactated ringers. 1000 milliLiter(s) (125 mL/Hr) IV Continuous <Continuous>  diphtheria/tetanus/pertussis (acellular) Vaccine (ADAcel) 0.5 milliLiter(s) IntraMuscular once  oxytocin Infusion 41.667 milliUNIT(s)/Min (125 mL/Hr) IV Continuous <Continuous>  ferrous    sulfate 325 milliGRAM(s) Oral daily  oxyCODONE    IR 5 milliGRAM(s) Oral every 3 hours  acetaminophen   Tablet. 975 milliGRAM(s) Oral every 6 hours  ibuprofen  Tablet 600 milliGRAM(s) Oral every 6 hours      MEDICATIONS  (PRN):  acetaminophen   Tablet. 975 milliGRAM(s) Oral every 6 hours PRN Moderate Pain (4 - 6)  fentaNYL (3 MICROgram(s)/mL) + BUpivacaine 0.01% in 0.9% Sodium Chloride PCEA Rescue Clinician Bolus 5 milliLiter(s) Epidural every 15 minutes PRN Moderate Pain (4 - 6)  naloxone Injectable 0.1 milliGRAM(s) IV Push every 3 minutes PRN For ANY of the following changes in patient status:  A. RR LESS THAN 10 breaths per minute, B. Oxygen saturation LESS THAN 90%, C. Sedation score of 6  ondansetron Injectable 4 milliGRAM(s) IV Push every 6 hours PRN Nausea  dexamethasone  Injectable 4 milliGRAM(s) IV Push every 6 hours PRN Nausea, IF ondansetron is ineffective after 30 - 60 minutes  simethicone 80 milliGRAM(s) Chew every 4 hours PRN Gas  diphenhydrAMINE   Capsule 25 milliGRAM(s) Oral every 6 hours PRN Itching  glycerin Suppository - Adult 1 Suppository(s) Rectal at bedtime PRN Constipation  docusate sodium 100 milliGRAM(s) Oral two times a day PRN Stool Softening  lanolin Ointment 1 Application(s) Topical every 3 hours PRN Sore Nipples  oxyCODONE    IR 5 milliGRAM(s) Oral every 4 hours PRN Severe Pain (7 - 10)      Labs:  Blood type: A Positive  Rubella IgG: RPR: Negative                          10.6<L>   16.7<H> >-----------< 149<L>    ( 09-20 @ 07:02 )             30.2<L>                        12.3   9.2 >-----------< 163    ( 09-19 @ 06:58 )             36.1        PE:  General: NAD  Abdomen: Soft, appropriately tender, incision c/d/i.  Pelvic: Lochia normal   Extremities: NTBL OB Progress Note: TLCS, POD#2    S: 21yo  POD#2 s/p pLTCS at 35wk for placenta previa. Pain is well controlled. She is tolerating a regular diet and passing flatus. She is voiding spontaneously, and ambulating without difficulty. Denies CP/SOB. Denies lightheadedness/dizziness. Denies N/V.    O:  Vitals:  Vital Signs Last 24 Hrs  T(C): 36.5 (21 Sep 2017 05:00), Max: 37.2 (20 Sep 2017 14:06)  T(F): 97.7 (21 Sep 2017 05:00), Max: 99 (20 Sep 2017 14:06)  HR: 71 (21 Sep 2017 05:00) (65 - 99)  BP: 98/62 (21 Sep 2017 05:00) (97/60 - 107/66)  BP(mean): --  RR: 18 (21 Sep 2017 05:00) (18 - 18)  SpO2: 99% (21 Sep 2017 05:00) (97% - 99%)    MEDICATIONS  (STANDING):  prenatal multivitamin 1 Tablet(s) Oral daily  heparin  Injectable 5000 Unit(s) SubCutaneous every 12 hours  docusate sodium 100 milliGRAM(s) Oral two times a day  oxytocin Infusion 333.333 milliUNIT(s)/Min (1000 mL/Hr) IV Continuous <Continuous>  oxytocin Infusion 41.667 milliUNIT(s)/Min (125 mL/Hr) IV Continuous <Continuous>  fentaNYL (3 MICROgram(s)/mL) + BUpivacaine 0.01% in 0.9% Sodium Chloride PCEA 250 milliLiter(s) Epidural PCA Continuous  lactated ringers. 1000 milliLiter(s) (125 mL/Hr) IV Continuous <Continuous>  diphtheria/tetanus/pertussis (acellular) Vaccine (ADAcel) 0.5 milliLiter(s) IntraMuscular once  oxytocin Infusion 41.667 milliUNIT(s)/Min (125 mL/Hr) IV Continuous <Continuous>  ferrous    sulfate 325 milliGRAM(s) Oral daily  oxyCODONE    IR 5 milliGRAM(s) Oral every 3 hours  acetaminophen   Tablet. 975 milliGRAM(s) Oral every 6 hours  ibuprofen  Tablet 600 milliGRAM(s) Oral every 6 hours      MEDICATIONS  (PRN):  acetaminophen   Tablet. 975 milliGRAM(s) Oral every 6 hours PRN Moderate Pain (4 - 6)  fentaNYL (3 MICROgram(s)/mL) + BUpivacaine 0.01% in 0.9% Sodium Chloride PCEA Rescue Clinician Bolus 5 milliLiter(s) Epidural every 15 minutes PRN Moderate Pain (4 - 6)  naloxone Injectable 0.1 milliGRAM(s) IV Push every 3 minutes PRN For ANY of the following changes in patient status:  A. RR LESS THAN 10 breaths per minute, B. Oxygen saturation LESS THAN 90%, C. Sedation score of 6  ondansetron Injectable 4 milliGRAM(s) IV Push every 6 hours PRN Nausea  dexamethasone  Injectable 4 milliGRAM(s) IV Push every 6 hours PRN Nausea, IF ondansetron is ineffective after 30 - 60 minutes  simethicone 80 milliGRAM(s) Chew every 4 hours PRN Gas  diphenhydrAMINE   Capsule 25 milliGRAM(s) Oral every 6 hours PRN Itching  glycerin Suppository - Adult 1 Suppository(s) Rectal at bedtime PRN Constipation  docusate sodium 100 milliGRAM(s) Oral two times a day PRN Stool Softening  lanolin Ointment 1 Application(s) Topical every 3 hours PRN Sore Nipples  oxyCODONE    IR 5 milliGRAM(s) Oral every 4 hours PRN Severe Pain (7 - 10)      Labs:  Blood type: A Positive  Rubella IgG: RPR: Negative                          10.6<L>   16.7<H> >-----------< 149<L>    ( 09-20 @ 07:02 )             30.2<L>                        12.3   9.2 >-----------< 163    ( 09-19 @ 06:58 )             36.1        PE:  General: NAD  Abdomen: Soft, appropriately tender,   incision c/d/i.  Pelvic: Lochia normal   Extremities: NTBL

## 2017-09-21 NOTE — PROGRESS NOTE ADULT - ASSESSMENT
A/P: 23yo POD#2 s/p LTCS at 35wk for placenta previa.  Patient is stable and doing well post-operatively.

## 2017-09-22 ENCOUNTER — TRANSCRIPTION ENCOUNTER (OUTPATIENT)
Age: 22
End: 2017-09-22

## 2017-09-22 VITALS
DIASTOLIC BLOOD PRESSURE: 67 MMHG | RESPIRATION RATE: 18 BRPM | TEMPERATURE: 98 F | SYSTOLIC BLOOD PRESSURE: 105 MMHG | HEART RATE: 78 BPM | OXYGEN SATURATION: 97 %

## 2017-09-22 LAB
HCT VFR BLD CALC: 29.4 % — LOW (ref 34.5–45)
HGB BLD-MCNC: 10 G/DL — LOW (ref 11.5–15.5)
MCHC RBC-ENTMCNC: 33.6 PG — SIGNIFICANT CHANGE UP (ref 27–34)
MCHC RBC-ENTMCNC: 34.1 GM/DL — SIGNIFICANT CHANGE UP (ref 32–36)
MCV RBC AUTO: 98.7 FL — SIGNIFICANT CHANGE UP (ref 80–100)
PLATELET # BLD AUTO: 136 K/UL — LOW (ref 150–400)
RBC # BLD: 2.98 M/UL — LOW (ref 3.8–5.2)
RBC # FLD: 14 % — SIGNIFICANT CHANGE UP (ref 10.3–14.5)
WBC # BLD: 10.9 K/UL — HIGH (ref 3.8–10.5)
WBC # FLD AUTO: 10.9 K/UL — HIGH (ref 3.8–10.5)

## 2017-09-22 PROCEDURE — 86762 RUBELLA ANTIBODY: CPT

## 2017-09-22 PROCEDURE — 86850 RBC ANTIBODY SCREEN: CPT

## 2017-09-22 PROCEDURE — 59025 FETAL NON-STRESS TEST: CPT

## 2017-09-22 PROCEDURE — 90707 MMR VACCINE SC: CPT

## 2017-09-22 PROCEDURE — 85610 PROTHROMBIN TIME: CPT

## 2017-09-22 PROCEDURE — 59050 FETAL MONITOR W/REPORT: CPT

## 2017-09-22 PROCEDURE — 85027 COMPLETE CBC AUTOMATED: CPT

## 2017-09-22 PROCEDURE — 86901 BLOOD TYPING SEROLOGIC RH(D): CPT

## 2017-09-22 PROCEDURE — C1889: CPT

## 2017-09-22 PROCEDURE — 88307 TISSUE EXAM BY PATHOLOGIST: CPT

## 2017-09-22 PROCEDURE — 90715 TDAP VACCINE 7 YRS/> IM: CPT

## 2017-09-22 PROCEDURE — P9016: CPT

## 2017-09-22 PROCEDURE — 86923 COMPATIBILITY TEST ELECTRIC: CPT

## 2017-09-22 PROCEDURE — 85384 FIBRINOGEN ACTIVITY: CPT

## 2017-09-22 PROCEDURE — 81001 URINALYSIS AUTO W/SCOPE: CPT

## 2017-09-22 PROCEDURE — 85730 THROMBOPLASTIN TIME PARTIAL: CPT

## 2017-09-22 PROCEDURE — 90686 IIV4 VACC NO PRSV 0.5 ML IM: CPT

## 2017-09-22 PROCEDURE — 86900 BLOOD TYPING SEROLOGIC ABO: CPT

## 2017-09-22 PROCEDURE — 86780 TREPONEMA PALLIDUM: CPT

## 2017-09-22 RX ORDER — OXYCODONE HYDROCHLORIDE 5 MG/1
1 TABLET ORAL
Qty: 24 | Refills: 0 | OUTPATIENT
Start: 2017-09-22 | End: 2017-09-26

## 2017-09-22 RX ORDER — INFLUENZA VIRUS VACCINE 15; 15; 15; 15 UG/.5ML; UG/.5ML; UG/.5ML; UG/.5ML
0.5 SUSPENSION INTRAMUSCULAR ONCE
Qty: 0 | Refills: 0 | Status: COMPLETED | OUTPATIENT
Start: 2017-09-22 | End: 2017-09-22

## 2017-09-22 RX ORDER — MEDROXYPROGESTERONE ACETATE 150 MG/ML
150 INJECTION, SUSPENSION, EXTENDED RELEASE INTRAMUSCULAR ONCE
Qty: 0 | Refills: 0 | Status: COMPLETED | OUTPATIENT
Start: 2017-09-22 | End: 2017-09-22

## 2017-09-22 RX ADMIN — OXYCODONE HYDROCHLORIDE 5 MILLIGRAM(S): 5 TABLET ORAL at 13:18

## 2017-09-22 RX ADMIN — Medication 975 MILLIGRAM(S): at 13:19

## 2017-09-22 RX ADMIN — OXYCODONE HYDROCHLORIDE 5 MILLIGRAM(S): 5 TABLET ORAL at 07:13

## 2017-09-22 RX ADMIN — Medication 600 MILLIGRAM(S): at 00:14

## 2017-09-22 RX ADMIN — Medication 975 MILLIGRAM(S): at 07:13

## 2017-09-22 RX ADMIN — Medication 100 MILLIGRAM(S): at 06:45

## 2017-09-22 RX ADMIN — MEDROXYPROGESTERONE ACETATE 150 MILLIGRAM(S): 150 INJECTION, SUSPENSION, EXTENDED RELEASE INTRAMUSCULAR at 10:35

## 2017-09-22 RX ADMIN — Medication 975 MILLIGRAM(S): at 06:46

## 2017-09-22 RX ADMIN — Medication 600 MILLIGRAM(S): at 12:12

## 2017-09-22 RX ADMIN — INFLUENZA VIRUS VACCINE 0.5 MILLILITER(S): 15; 15; 15; 15 SUSPENSION INTRAMUSCULAR at 06:46

## 2017-09-22 RX ADMIN — Medication 975 MILLIGRAM(S): at 12:12

## 2017-09-22 RX ADMIN — Medication 325 MILLIGRAM(S): at 12:12

## 2017-09-22 RX ADMIN — Medication 600 MILLIGRAM(S): at 13:18

## 2017-09-22 RX ADMIN — OXYCODONE HYDROCHLORIDE 5 MILLIGRAM(S): 5 TABLET ORAL at 12:12

## 2017-09-22 RX ADMIN — OXYCODONE HYDROCHLORIDE 5 MILLIGRAM(S): 5 TABLET ORAL at 10:00

## 2017-09-22 RX ADMIN — OXYCODONE HYDROCHLORIDE 5 MILLIGRAM(S): 5 TABLET ORAL at 06:46

## 2017-09-22 RX ADMIN — OXYCODONE HYDROCHLORIDE 5 MILLIGRAM(S): 5 TABLET ORAL at 09:04

## 2017-09-22 RX ADMIN — Medication 975 MILLIGRAM(S): at 00:14

## 2017-09-22 RX ADMIN — Medication 1 TABLET(S): at 12:12

## 2017-09-22 RX ADMIN — OXYCODONE HYDROCHLORIDE 5 MILLIGRAM(S): 5 TABLET ORAL at 00:14

## 2017-09-22 RX ADMIN — Medication 600 MILLIGRAM(S): at 06:46

## 2017-09-22 RX ADMIN — Medication 0.5 MILLILITER(S): at 12:12

## 2017-09-22 RX ADMIN — Medication 100 MILLIGRAM(S): at 12:12

## 2017-09-22 RX ADMIN — Medication 600 MILLIGRAM(S): at 07:13

## 2017-09-22 NOTE — DISCHARGE NOTE OB - MEDICATION SUMMARY - MEDICATIONS TO TAKE
I will START or STAY ON the medications listed below when I get home from the hospital:    oxyCODONE 5 mg oral tablet  -- 1 tab(s) by mouth every 4 hours MDD:6 tabs per day  -- Caution federal law prohibits the transfer of this drug to any person other  than the person for whom it was prescribed.  It is very important that you take or use this exactly as directed.  Do not skip doses or discontinue unless directed by your doctor.  May cause drowsiness.  Alcohol may intensify this effect.  Use care when operating dangerous machinery.  This prescription cannot be refilled.  Using more of this medication than prescribed may cause serious breathing problems.    -- Indication: For prn pain

## 2017-09-22 NOTE — DISCHARGE NOTE OB - MEDICATION SUMMARY - MEDICATIONS TO STOP TAKING
I will STOP taking the medications listed below when I get home from the hospital:    Prenatal Multivitamins with Folic Acid 0.4 mg oral capsule  -- 1 cap(s) by mouth once a day  -- May discolor urine or feces.  Take with food.    oxyCODONE 5 mg oral tablet  -- 1 tab(s) by mouth every 4 hours MDD:6 tabs per day  -- Caution federal law prohibits the transfer of this drug to any person other  than the person for whom it was prescribed.  It is very important that you take or use this exactly as directed.  Do not skip doses or discontinue unless directed by your doctor.  May cause drowsiness.  Alcohol may intensify this effect.  Use care when operating dangerous machinery.  This prescription cannot be refilled.  Using more of this medication than prescribed may cause serious breathing problems.

## 2017-09-22 NOTE — PROGRESS NOTE ADULT - PROBLEM SELECTOR PLAN 1
- Continue motrin, tylenol, oxycodone PRN for pain control.  - Increase ambulation  - Continue regular diet  - Depo for birth control   - Discharge planning  - Pt to follow up care at [facility]     Jenny Hoffman DO PGY1

## 2017-09-22 NOTE — DISCHARGE NOTE OB - PATIENT PORTAL LINK FT
“You can access the Follow[facility] Patient Portal, offered by [facility], by registering with the following website: [email or web address]”

## 2017-09-22 NOTE — DISCHARGE NOTE OB - PLAN OF CARE
Return to normal state After discharge, please stay on pelvic rest for 6 weeks, meaning no sexual intercourse, no tampons and no douching.  No driving for 2 weeks as women can loose a lot of blood during delivery and there is a possibility of being lightheaded/fainting.  No lifting objects heavier than baby for two weeks.  Expect to have vaginal bleeding/spotting for up to six weeks.  The bleeding should get lighter and more white/light brown with time.  For bleeding soaking more than a pad an hour or passing clots greater than the size of your fist, come in to the emergency department.    Follow up in clinic in 2 weeks for incision check.  Call clinic for noticeable increase in redness or swelling at incision, discharge from incision, or opening of skin at incision site.

## 2017-09-22 NOTE — DISCHARGE NOTE OB - ADDITIONAL INSTRUCTIONS
PLEASE FOLLOW UP NEXT WEEK FOR YOUR INCISION CHECK AT [facility] IN [place].  YOUR APPOINTMENT IS SCHEDULED FOR MONDAY 9/25/17 AT 10:30 AM.    PLEASE FOLLOW UP IN 6 WEEKS AT [facility] FOR YOUR POST PARTUM CHECK.  PLEASE SCHEDULE AT YOUR OWN CONVENIENCE.

## 2017-09-22 NOTE — PROGRESS NOTE ADULT - ATTENDING COMMENTS
Patient doing well, no complaints, out of bed.   No HA, CP, SOB  No heavy vaginal bleeding (VB)/normal lochia    Vital Signs Last 24 Hrs  T(C): 36.9 (22 Sep 2017 05:00), Max: 36.9 (21 Sep 2017 21:00)  HR: 78 (22 Sep 2017 05:00) (77 - 99)  BP: 105/67 (22 Sep 2017 05:00) (92/57 - 105/67)  RR: 18 (22 Sep 2017 05:00) (17 - 18)  SpO2: 97% (22 Sep 2017 05:00) (97% - 100%)    POD # 3 stable C/S for previa  below normal plts but asymptomatic--reviewed w  phone signs to look for ei bruising and increase bleeding  Return to office in 2 wks for incision check    Patient seen and evaluated by me. I agree with resident note unless otherwise stated.   Routine postpartum care, regular diet as tolerated, ambulate and pain control as needed.     ASHANTI Ovalles MD  Attending
Pt seen and evaluated by me;  I agree with above assessment and plan  Pt is doing well on POD 2 after c/s for complete previa  continue routine PO care

## 2017-09-22 NOTE — DISCHARGE NOTE OB - CARE PLAN
Principal Discharge DX:	 delivery delivered  Goal:	Return to normal state  Instructions for follow-up, activity and diet:	After discharge, please stay on pelvic rest for 6 weeks, meaning no sexual intercourse, no tampons and no douching.  No driving for 2 weeks as women can loose a lot of blood during delivery and there is a possibility of being lightheaded/fainting.  No lifting objects heavier than baby for two weeks.  Expect to have vaginal bleeding/spotting for up to six weeks.  The bleeding should get lighter and more white/light brown with time.  For bleeding soaking more than a pad an hour or passing clots greater than the size of your fist, come in to the emergency department.    Follow up in clinic in 2 weeks for incision check.  Call clinic for noticeable increase in redness or swelling at incision, discharge from incision, or opening of skin at incision site.

## 2017-09-22 NOTE — PROGRESS NOTE ADULT - SUBJECTIVE AND OBJECTIVE BOX
OB Postpartum Note:  Delivery, POD#3    S: 23yo  POD#3 s/p pLTCS. The patient feels well.  Pain is well controlled. She is tolerating a regular diet and passing flatus. She is voiding spontaneously, and ambulating without difficulty. Denies CP/SOB. Denies lightheadedness/dizziness. Denies N/V.    O:  Vitals:  Vital Signs Last 24 Hrs  T(C): 36.9 (22 Sep 2017 05:00), Max: 36.9 (21 Sep 2017 21:00)  T(F): 98.4 (22 Sep 2017 05:00), Max: 98.4 (21 Sep 2017 21:00)  HR: 78 (22 Sep 2017 05:00) (77 - 99)  BP: 105/67 (22 Sep 2017 05:00) (92/57 - 105/67)  BP(mean): --  RR: 18 (22 Sep 2017 05:00) (17 - 18)  SpO2: 97% (22 Sep 2017 05:00) (97% - 100%)    MEDICATIONS  (STANDING):  prenatal multivitamin 1 Tablet(s) Oral daily  heparin  Injectable 5000 Unit(s) SubCutaneous every 12 hours  docusate sodium 100 milliGRAM(s) Oral two times a day  oxytocin Infusion 333.333 milliUNIT(s)/Min (1000 mL/Hr) IV Continuous <Continuous>  oxytocin Infusion 41.667 milliUNIT(s)/Min (125 mL/Hr) IV Continuous <Continuous>  lactated ringers. 1000 milliLiter(s) (125 mL/Hr) IV Continuous <Continuous>  diphtheria/tetanus/pertussis (acellular) Vaccine (ADAcel) 0.5 milliLiter(s) IntraMuscular once  oxytocin Infusion 41.667 milliUNIT(s)/Min (125 mL/Hr) IV Continuous <Continuous>  ferrous    sulfate 325 milliGRAM(s) Oral daily  oxyCODONE    IR 5 milliGRAM(s) Oral every 3 hours  acetaminophen   Tablet. 975 milliGRAM(s) Oral every 6 hours  ibuprofen  Tablet 600 milliGRAM(s) Oral every 6 hours  measles/mumps/rubella Vaccine 0.5 milliLiter(s) SubCutaneous once  medroxyPROGESTERone depot Injectable 150 milliGRAM(s) IntraMuscular once    MEDICATIONS  (PRN):  acetaminophen   Tablet. 975 milliGRAM(s) Oral every 6 hours PRN Moderate Pain (4 - 6)  simethicone 80 milliGRAM(s) Chew every 4 hours PRN Gas  diphenhydrAMINE   Capsule 25 milliGRAM(s) Oral every 6 hours PRN Itching  glycerin Suppository - Adult 1 Suppository(s) Rectal at bedtime PRN Constipation  docusate sodium 100 milliGRAM(s) Oral two times a day PRN Stool Softening  lanolin Ointment 1 Application(s) Topical every 3 hours PRN Sore Nipples  oxyCODONE    IR 5 milliGRAM(s) Oral every 4 hours PRN Severe Pain (7 - 10)      LABS:  Blood type: A Positive  Rubella IgG: RPR: Negative                          10.0<L>   10.9<H> >-----------< 136<L>    (  @ 07:05 )             29.4<L>                        10.6<L>   16.7<H> >-----------< 149<L>    (  @ 07:02 )             30.2<L>        Physical exam:  Gen: NAD  Abdomen: Soft, nontender, no distension , firm uterine fundus  Incision: Clean, dry, and intact   Pelvic: Normal lochia noted  Ext: No calf tenderness

## 2017-09-22 NOTE — DISCHARGE NOTE OB - CARE PROVIDER_API CALL
Bess Patino), Obstetrics and Gynecology  [address] [address]  Phone: [phone]  Fax: [phone]

## 2017-09-22 NOTE — PROGRESS NOTE ADULT - ASSESSMENT
A/P: 23yo  POD#3 s/p pLTCS at 35wk for placenta previa. Patient is stable and is doing well post-operatively.

## 2017-09-22 NOTE — DISCHARGE NOTE OB - HOSPITAL COURSE
Patient had low transverse  section for placenta accreta.  Please see operative note for details.  During postpartum course patient's vitals were stable, vaginal bleeding appropriate, and pain well controlled.  Post operation day one hematocrit was appropriate 30.2 from 36.1 pre-op.  On day of discharge hematocrit was stable at 29.3, patient was ambulating, her pain controlled with oral medications, had adequate oral intake, and was voiding freely.  Discharge instructions and precautions were given.  Will return to clinic in 2 weeks for incision check.  Postpartum birth control plan is deproprovera. Patient to follow up care with [facility]. Patient had low transverse  section for placenta previa at 36wks.  Please see operative note for details.  During postpartum course patient's vitals were stable, vaginal bleeding appropriate, and pain well controlled.  Post operation day one hematocrit was appropriate 30.2 from 36.1 pre-op.  On day of discharge hematocrit was stable at 29.3, patient was ambulating, her pain controlled with oral medications, had adequate oral intake, and was voiding freely.  Discharge instructions and precautions were given.  Will return to clinic in 2 weeks for incision check.  Postpartum birth control plan is deproprovera. Patient to follow up care with [facility].

## 2017-09-22 NOTE — DISCHARGE NOTE OB - INSTRUCTIONS
Follow up with MD as directed call if any increased pain, fever, chills, shortness of breath, pain on urination, heavy vaginal bleeding.

## 2017-09-30 LAB — SURGICAL PATHOLOGY STUDY: SIGNIFICANT CHANGE UP

## 2018-05-22 NOTE — PATIENT PROFILE ADULT. - NS TRANSFER PATIENT BELONGINGS
Infusion Nursing Note:  Muriel Diaz presents today for Cycle 1 Day 1 Adriamycin, Cytoxan.    Patient seen by provider today: Yes: Dr. Mckeon   present during visit today: Not Applicable.    Note: N/A.    Intravenous Access:  Implanted Port.    Treatment Conditions:  Lab Results   Component Value Date    HGB 12.0 05/22/2018     Lab Results   Component Value Date    WBC 7.2 05/22/2018      Lab Results   Component Value Date    ANEU 4.6 05/22/2018     Lab Results   Component Value Date     05/22/2018      Lab Results   Component Value Date     05/22/2018                   Lab Results   Component Value Date    POTASSIUM 3.6 05/22/2018           No results found for: MAG         Lab Results   Component Value Date    CR 0.70 05/22/2018                   Lab Results   Component Value Date    HATTIE 8.9 05/22/2018                Lab Results   Component Value Date    BILITOTAL 0.2 05/22/2018           Lab Results   Component Value Date    ALBUMIN 3.7 05/22/2018                    Lab Results   Component Value Date    ALT 22 05/22/2018           Lab Results   Component Value Date    AST 20 05/22/2018       Results reviewed, labs MET treatment parameters, ok to proceed with treatment.    ECHO/MUGA completed 5/7/18  EF 55-60%.      Post Infusion Assessment:  Patient tolerated infusion without incident.  Blood return noted pre and post infusion.  Blood return noted during Adriamycin administration every 2 cc.  Site patent and intact, free from redness, edema or discomfort.  No evidence of extravasations.  Access discontinued per protocol.    ONPRO  Was placed on patient's: left side of abdomen.    Was placed at 1130 AM    ONPRO injector device Lot number: [id number]    Patient education included: what patient can expect after application, what colored lights mean on the device, when to remove device, when and where to call with questions or issues, all patients questions answered and that Neulasta  administration will occur at 1430.    Patient tolerated administration well.      Discharge Plan:   Prescription refills given for Decadron, Ativan, Compazine.  Discharge instructions reviewed with: Patient and  [place].  Patient and family verbalized understanding of discharge instructions and all questions answered.  Copy of AVS reviewed with patient and family.  Patient will return 6/5/18 for next appointment.  Patient discharged in stable condition accompanied by: self and .  Departure Mode: Ambulatory.    Susy Phillips RN                         None

## 2019-07-08 NOTE — PATIENT PROFILE OB - INTERPRETER'S NAME
PAST SURGICAL HISTORY:  H/O total hysterectomy     History of bilateral tubal ligation     History of hip replacement left Frida Vargas

## 2021-09-20 NOTE — DISCHARGE NOTE OB - NS OB DC TDAP REASON NOT RECEIVED
If you are a smoker, it is important for your health to stop smoking. Please be aware that second hand smoke is also harmful. Contraindicated

## 2021-12-14 NOTE — DISCHARGE NOTE ANTEPARTUM - 30 MINUTES AFTER A MEAL, LIE ON YOUR RIGHT SIDE AND COUNT THE NUMBER OF TIMES THE BABY MOVES IN 30 MINUTES.
PATIENTS DAUGHTER IS CALLING STATING HER MOMS HUMALOG 75/25 IS DISCONTINUED AND WAS TOLD TO CONTACT OUR OFFICE. THEY NEED A PRESCRIPTION FOR INSULIN. PATIENTS NUMBER [id number]. THEY USE [facility] (PRESCRIPTION NUMBER IS [id number])   Statement Selected

## 2023-06-02 NOTE — DISCHARGE NOTE OB - PERSISTENT HEADACHE, BLURRED VISION
The pathology results demonstrated Serrated adenoma, precancerous polyp.  Due to poor prep, arrange for recall colonoscopy with better preparation.    Dulcolax 10 mg p.o. q.day x1 week prior.    MiraLax 17 g p.o. b.i.d. for 1 week prior.    Avoid nuts and seeds for 1 week prior.    Strict clear liquid diet for 1 day prior along with preparation.     Statement Selected

## 2023-07-06 NOTE — PATIENT PROFILE ADULT. - TOBACCO USE
Problem: Discharge Planning  Goal: Discharge to home or other facility with appropriate resources  7/6/2023 1136 by Lani Brown RN  Outcome: Progressing  7/6/2023 0006 by Leobardo Kaur RN  Outcome: Progressing     Problem: Safety - Adult  Goal: Free from fall injury  7/6/2023 1136 by Lani Brown RN  Outcome: Progressing  7/6/2023 0006 by Leobardo Kaur RN  Outcome: Progressing     Problem: ABCDS Injury Assessment  Goal: Absence of physical injury  7/6/2023 1136 by Lani Brown RN  Outcome: Progressing  7/6/2023 0006 by Leobardo Kaur RN  Outcome: Progressing     Problem: Musculoskeletal - Adult  Goal: Return mobility to safest level of function  7/6/2023 1136 by Lani Brown RN  Outcome: Progressing  7/6/2023 0006 by Leobardo Kaur RN  Outcome: Progressing     Problem: Gastrointestinal - Adult  Goal: Minimal or absence of nausea and vomiting  7/6/2023 1136 by Lani Brown RN  Outcome: Progressing  7/6/2023 0006 by Leobardo Kaur RN  Outcome: Progressing  Goal: Maintains adequate nutritional intake  7/6/2023 1136 by Lani Brown RN  Outcome: Progressing  7/6/2023 0006 by Leobardo Kaur RN  Outcome: Progressing     Problem: Infection - Adult  Goal: Absence of infection at discharge  7/6/2023 1136 by Lani Brown RN  Outcome: Progressing  7/6/2023 0006 by Leobardo Kaur RN  Outcome: Progressing  Goal: Absence of infection during hospitalization  7/6/2023 1136 by Lani Brown RN  Outcome: Progressing  7/6/2023 0006 by Leobardo Kaur RN  Outcome: Progressing     Problem: Metabolic/Fluid and Electrolytes - Adult  Goal: Electrolytes maintained within normal limits  7/6/2023 1136 by Lani Brown RN  Outcome: Progressing  7/6/2023 0006 by Leobardo Kaur RN  Outcome: Progressing  Goal: Hemodynamic stability and optimal renal function maintained  7/6/2023 1136 by Lani Brown RN  Outcome: Progressing  7/6/2023 0006 by Leobardo Kaur RN  Outcome: Progressing     Problem: Hematologic - Adult  Goal: Maintains hematologic Never smoker

## 2024-09-09 NOTE — PATIENT PROFILE OB - NS PRO PT RIGHT SUPPORT PERSON
BP Readings from Last 1 Encounters:   09/09/24 (!) 156/96      Took herself off of benazepril 2 months ago because she thought it was making her blood pressure   go up  Not at goal today.  Hypertension handled by Cardiology who recently changed her to Toprol   Yes

## 2025-01-31 NOTE — DISCHARGE NOTE OB - BREAST MILK IS MORE DIGESTIBLE, MAKING VOMITING, DIARRHEA, GAS AND CONSTIPATION LESS COMMON
Patient was a no show to follow up with Enrike Schneider PA-C on 01/31/25, 09/11/23 & 08/04/23.    Patient was also to complete 3 doses of venofer, of which she only completed 1.    Per Enrike, patient to be discharged from the clinic due to multiple no shows.    Letter sent.  
Statement Selected